# Patient Record
Sex: FEMALE | Race: BLACK OR AFRICAN AMERICAN | Employment: FULL TIME | URBAN - METROPOLITAN AREA
[De-identification: names, ages, dates, MRNs, and addresses within clinical notes are randomized per-mention and may not be internally consistent; named-entity substitution may affect disease eponyms.]

---

## 2017-03-07 ENCOUNTER — PATIENT OUTREACH (OUTPATIENT)
Dept: INTERNAL MEDICINE CLINIC | Age: 37
End: 2017-03-07

## 2018-05-17 ENCOUNTER — OFFICE VISIT (OUTPATIENT)
Dept: INTERNAL MEDICINE CLINIC | Age: 38
End: 2018-05-17

## 2018-05-17 VITALS — HEIGHT: 65 IN | BODY MASS INDEX: 27.49 KG/M2 | WEIGHT: 165 LBS | RESPIRATION RATE: 16 BRPM

## 2018-05-17 DIAGNOSIS — J45.20 MILD INTERMITTENT ASTHMA WITHOUT COMPLICATION: ICD-10-CM

## 2018-05-17 DIAGNOSIS — Z79.4 CONTROLLED TYPE 2 DIABETES MELLITUS WITHOUT COMPLICATION, WITH LONG-TERM CURRENT USE OF INSULIN (HCC): Primary | ICD-10-CM

## 2018-05-17 DIAGNOSIS — E11.9 CONTROLLED TYPE 2 DIABETES MELLITUS WITHOUT COMPLICATION, WITH LONG-TERM CURRENT USE OF INSULIN (HCC): Primary | ICD-10-CM

## 2018-05-17 DIAGNOSIS — L50.9 URTICARIA: ICD-10-CM

## 2018-05-17 LAB
CHOLEST SERPL-MCNC: 229 MG/DL
GLUCOSE POC: 388 MG/DL
HBA1C MFR BLD HPLC: 12.2 %
HDLC SERPL-MCNC: 91 MG/DL
LDL CHOLESTEROL POC: 120 MG/DL
NON-HDL CHOLESTEROL, 011976: 138
TCHOL/HDL RATIO (POC): 2.5
TRIGL SERPL-MCNC: 93 MG/DL

## 2018-05-17 RX ORDER — PREDNISONE 10 MG/1
TABLET ORAL
Qty: 21 TAB | Refills: 12 | Status: SHIPPED | OUTPATIENT
Start: 2018-05-17 | End: 2018-05-21 | Stop reason: ALTCHOICE

## 2018-05-17 RX ORDER — EPINEPHRINE 0.3 MG/.3ML
0.3 INJECTION SUBCUTANEOUS
Qty: 2 SYRINGE | Refills: 3 | Status: SHIPPED | OUTPATIENT
Start: 2018-05-17 | End: 2021-06-09

## 2018-05-17 RX ORDER — PEN NEEDLE, DIABETIC 29 G X1/2"
NEEDLE, DISPOSABLE MISCELLANEOUS
Qty: 100 PEN NEEDLE | Refills: 12 | Status: SHIPPED | OUTPATIENT
Start: 2018-05-17

## 2018-05-17 RX ORDER — MONTELUKAST SODIUM 10 MG/1
10 TABLET ORAL DAILY
Qty: 30 TAB | Refills: 12 | Status: SHIPPED | OUTPATIENT
Start: 2018-05-17 | End: 2021-06-09

## 2018-05-17 NOTE — PROGRESS NOTES
Nola Dubon is a 30 y.o. female and presents with ED Follow-up and Allergic Reaction  . Subjective:    She had a recent allergy with hives reported  States she used dye in her hair prior to most recent episode. Asthma Review:  The patient is being seen for follow up of asthma,  currently stable. Asthma symptoms occur: infrequently. Wheezing when present is described as mild and easily relieved with rescue bronchodilator. The patient reports use of a steroid inhaler. Frequency of use of quick-relief meds: rarely. Regimen compliance: The patient reports adherence to this regimen. Diabetes Mellitus Review:  She has diabetes mellitus. Diabetic ROS - medication compliance: compliant all of the time, diabetic diet compliance: compliant all of the time, home glucose monitoring: is performed. Known diabetic complications: none  Cardiovascular risk factors: family history, dyslipidemia, diabetes mellitus, obesity, hypertension  Current diabetic medications include insulin   Eye exam current (within one year): no  Weight trend: stable  Prior visit with dietician: no  Current diet: \"healthy\" diet  in general  Current exercise: walking  Current monitoring regimen: home blood tests - daily  Home blood sugar records: trend: stable  Any episodes of hypoglycemia? no  Is She on ACE inhibitor or angiotensin II receptor blocker? Yes     Allergic Rhinitis  Patient presents for evaluation of allergic symptoms. Symptoms include nasal congestion, rhinorrhea, sneezing, eye itching, watery eyes. Precipitants haved included possible pollen. Review of Systems  Constitutional: negative for fevers, chills, anorexia and weight loss  Eyes:   negative for visual disturbance and irritation  ENT:   negative for tinnitus,sore throat,nasal congestion,ear pains. hoarseness  Respiratory:  negative for cough, hemoptysis, dyspnea,wheezing  CV:   negative for chest pain, palpitations, lower extremity edema  GI: negative for nausea, vomiting, diarrhea, abdominal pain,melena  Endo:               negative for polyuria,polydipsia,polyphagia,heat intolerance  Genitourinary: negative for frequency, dysuria and hematuria  Integument:  negative for rash and pruritus  Hematologic:  negative for easy bruising and gum/nose bleeding  Musculoskel: negative for myalgias, arthralgias, back pain, muscle weakness, joint pain  Neurological:  negative for headaches, dizziness, vertigo, memory problems and gait   Behavl/Psych: negative for feelings of anxiety, depression, mood changes    Past Medical History:   Diagnosis Date    Asthma     Diabetes ([address]     Type 1     Past Surgical History:   Procedure Laterality Date    HX  SECTION  2007    HX GYN  2010    L ectopic pregnancy, L fallopian tube removed     Social History     Social History    Marital status: SINGLE     Spouse name: N/A    Number of children: N/A    Years of education: N/A     Social History Main Topics    Smoking status: Never Smoker    Smokeless tobacco: Never Used    Alcohol use No    Drug use: No    Sexual activity: Yes     Partners: Male     Birth control/ protection: None     Other Topics Concern    None     Social History Narrative     Family History   Problem Relation Age of Onset    Diabetes Mother     Hypertension Mother     Diabetes Father     Hypertension Father      Current Outpatient Prescriptions   Medication Sig Dispense Refill    insulin regular (NOVOLIN R REGULAR U-100 INSULN) 100 unit/mL injection by SubCUTAneous route.  insulin NPH (HUMULIN N NPH U-100 INSULIN) 100 unit/mL injection by SubCUTAneous route once.  montelukast (SINGULAIR) 10 mg tablet Take 1 Tab by mouth daily.  30 Tab 12    Insulin Needles, Disposable, 29 gauge x 1/2\" ndle Use as directed 100 Pen Needle 12    predniSONE (DELTASONE) 10 mg tablet 6 tabs today and reduce by 1 tab daily 21 Tab 12    EPINEPHrine (EPIPEN 2-SUMAYA) 0.3 mg/0.3 mL injection 0.3 mL by IntraMUSCular route once as needed for up to 1 dose. 2 Syringe 3    Insulin Syringe-Needle U-100 (BD INSULIN SYRINGE SAFETYGLIDE) 0.5 mL 29 gauge x 1/2\" syrg Use with insulin 120 Syringe 3    glucose blood VI test strips (ASCENSIA CONTOUR) strip Test blood sugar 3 times daily 100 Strip 12    Blood-Glucose Meter (CONTOUR METER) monitoring kit Use as directed 1 Kit 0    Lancets Misc As directed 1 Package 11    insulin NPH (NOVOLIN N) 100 unit/mL injection 20 units am 20 units pm 3 Vial 3    insulin regular (NOVOLIN R) 100 unit/mL injection 20 units am,lunch,dinner 3 Vial 3    sucralfate (CARAFATE) 1 gram tablet Take 1 Tab by mouth four (4) times daily. 120 Tab 12    Omeprazole delayed release (PRILOSEC D/R) 20 mg tablet Take 1 Tab by mouth daily. 30 Tab 3    albuterol (PROVENTIL HFA, VENTOLIN HFA, PROAIR HFA) 90 mcg/actuation inhaler Take 2 Puffs by inhalation every four (4) hours as needed for Wheezing. 1 Inhaler 12    ipratropium (ATROVENT) 0.06 % nasal spray 2 Sprays by Both Nostrils route four (4) times daily. 15 mL 12    pantoprazole (PROTONIX) 40 mg tablet TAKE 1 TABLET BY MOUTH DAILY 30 Tab 0    traMADol (ULTRAM) 50 mg tablet Take 1 Tab by mouth every six (6) hours as needed for Pain. Max Daily Amount: 200 mg. 50 Tab 0    traMADol (ULTRAM) 50 mg tablet Take 1 Tab by mouth every six (6) hours as needed for Pain. 30 Tab 0    insulin NPH (NOVOLIN N, HUMULIN N) 100 unit/mL injection 30 units am,30 units pm 3 vial 12    insulin regular (NOVOLIN R, HUMULIN R) 100 unit/mL injection 20 units am 20 units noon 20 units pm 3 vial 12    triamcinolone acetonide (KENALOG) 0.5 % ointment Apply  to affected area two (2) times a day. use thin layer 30 g 3    insulin aspart (NOVOLOG) 100 unit/mL injection by SubCUTAneous route.  insulin (NOVOLOG 70-30 MIX) 100 unit/mL (70-30) flex pen 30 units bid 1 Package 12    ibuprofen (MOTRIN) 800 mg tablet Take 1 Tab by mouth two (2) times daily (after meals). 60 Tab 12    acetaminophen-codeine (TYLENOL-CODEINE #3) 300-30 mg per tablet Take 1 Tab by mouth every four (4) hours as needed for Pain. 60 Tab 0    insulin lispro (HUMALOG) 100 unit/mL injection Use as a sliding scale as directed 3 Vial 12    clobetasol (TEMOVATE) 0.05 % ointment Apply  to affected area two (2) times a day. 15 g 0    codeine-butalbital-aspirin-caffeine (FIORINAL WITH CODEINE) 62--40 mg per capsule Take 1 Cap by mouth every eight (8) hours as needed for Pain. 30 Cap 0     Allergies   Allergen Reactions    Doxycycline Hives       Objective:  Visit Vitals    Resp 16    Ht 5' 5\" (1.651 m)    Wt 165 lb (74.8 kg)    BMI 27.46 kg/m2     Physical Exam:   General appearance - alert, well appearing, and in no distress  Mental status - alert, oriented to person, place, and time  EYE-HARRY, EOMI, corneas normal, no foreign bodies  ENT-ENT exam normal, no neck nodes or sinus tenderness  Nose - normal and patent, no erythema, discharge or polyps  Mouth - mucous membranes moist, pharynx normal without lesions  Neck - supple, no significant adenopathy   Chest - clear to auscultation, no wheezes, rales or rhonchi, symmetric air entry   Heart - normal rate, regular rhythm, normal S1, S2, no murmurs, rubs, clicks or gallops   Abdomen - soft, nontender, nondistended, no masses or organomegaly  Lymph- no adenopathy palpable  Ext-peripheral pulses normal, no pedal edema, no clubbing or cyanosis  Skin-Warm and dry.  no hyperpigmentation, vitiligo, or suspicious lesions  Neuro -alert, oriented, normal speech, no focal findings or movement disorder noted  Neck-normal C-spine, no tenderness, full ROM without pain      Results for orders placed or performed in visit on 05/17/18   AMB POC LIPID PROFILE   Result Value Ref Range    Cholesterol (POC) 229     Triglycerides (POC) 93     HDL Cholesterol (POC) 91     Non-HDL Cholesterol 138     LDL Cholesterol (POC) 120 MG/DL    TChol/HDL Ratio (POC) 2.5    AMB POC HEMOGLOBIN A1C   Result Value Ref Range    Hemoglobin A1c (POC) 12.2 %   AMB POC GLUCOSE BLOOD, BY GLUCOSE MONITORING DEVICE   Result Value Ref Range    Glucose  mg/dL       Assessment/Plan:    ICD-10-CM ICD-9-CM    1. Controlled type 2 diabetes mellitus without complication, with long-term current use of insulin ([facility]) E11.9 250.00 AMB POC LIPID PROFILE    Z79.4 V58.67 AMB POC HEMOGLOBIN A1C      AMB POC GLUCOSE BLOOD, BY GLUCOSE MONITORING DEVICE   2. Urticaria L50.9 708.9 predniSONE (DELTASONE) 10 mg tablet   3. Uncontrolled type 1 diabetes mellitus without complication ([facility]) H11.04 250.03 Insulin Needles, Disposable, 29 gauge x 1/2\" ndle   4. Mild intermittent asthma without complication W93.54 827.36      Orders Placed This Encounter    AMB POC LIPID PROFILE    AMB POC HEMOGLOBIN A1C    AMB POC GLUCOSE BLOOD, BY GLUCOSE MONITORING DEVICE    insulin regular (NOVOLIN R REGULAR U-100 INSULN) 100 unit/mL injection     Sig: by SubCUTAneous route.  insulin NPH (HUMULIN N NPH U-100 INSULIN) 100 unit/mL injection     Sig: by SubCUTAneous route once.  montelukast (SINGULAIR) 10 mg tablet     Sig: Take 1 Tab by mouth daily. Dispense:  30 Tab     Refill:  12    Insulin Needles, Disposable, 29 gauge x 1/2\" ndle     Sig: Use as directed     Dispense:  100 Pen Needle     Refill:  12    predniSONE (DELTASONE) 10 mg tablet     Si tabs today and reduce by 1 tab daily     Dispense:  21 Tab     Refill:  12    EPINEPHrine (EPIPEN 2-SUMAYA) 0.3 mg/0.3 mL injection     Si.3 mL by IntraMUSCular route once as needed for up to 1 dose. Dispense:  2 Syringe     Refill:  3     routine labs ordered,Take 81mg aspirin daily  Patient Instructions   [employer] Activation    Thank you for requesting access to [employer]. Please follow the instructions below to securely access and download your online medical record.  [employer] allows you to send messages to your doctor, view your test results, renew your prescriptions, schedule appointments, and more. How Do I Sign Up? 1. In your internet browser, go to [email or web address]  2. Click on the First Time User? Click Here link in the Sign In box. You will be redirect to the New Member Sign Up page. 3. Enter your [employer] Access Code exactly as it appears below. You will not need to use this code after youve completed the sign-up process. If you do not sign up before the expiration date, you must request a new code. [employer] Access Code: [email or web address]  Expires: 8/15/2018  2:22 PM (This is the date your [employer] access code will )    4. Enter the last four digits of your Social Security Number (xxxx) and Date of Birth (mm/dd/yyyy) as indicated and click Submit. You will be taken to the next sign-up page. 5. Create a [employer] ID. This will be your [employer] login ID and cannot be changed, so think of one that is secure and easy to remember. 6. Create a [employer] password. You can change your password at any time. 7. Enter your Password Reset Question and Answer. This can be used at a later time if you forget your password. 8. Enter your e-mail address. You will receive e-mail notification when new information is available in [address] 9. Click Sign Up. You can now view and download portions of your medical record. 10. Click the Download Summary menu link to download a portable copy of your medical information. Additional Information    If you have questions, please visit the Frequently Asked Questions section of the [employer] website at [email or web address] Remember, [employer] is NOT to be used for urgent needs. For medical emergencies, dial 911. Follow-up Disposition:  Return in about 1 week (around 2018). I have reviewed with the patient details of the assessment and plan and all questions were answered. Relevent patient education was performed    An After Visit Summary was printed and given to the patient.

## 2018-05-17 NOTE — PATIENT INSTRUCTIONS
[employer] Activation    Thank you for requesting access to [employer]. Please follow the instructions below to securely access and download your online medical record. [employer] allows you to send messages to your doctor, view your test results, renew your prescriptions, schedule appointments, and more. How Do I Sign Up? 1. In your internet browser, go to [email or web address]  2. Click on the First Time User? Click Here link in the Sign In box. You will be redirect to the New Member Sign Up page. 3. Enter your [employer] Access Code exactly as it appears below. You will not need to use this code after youve completed the sign-up process. If you do not sign up before the expiration date, you must request a new code. [employer] Access Code: [email or web address]  Expires: 8/15/2018  2:22 PM (This is the date your [employer] access code will )    4. Enter the last four digits of your Social Security Number (xxxx) and Date of Birth (mm/dd/yyyy) as indicated and click Submit. You will be taken to the next sign-up page. 5. Create a [employer] ID. This will be your [employer] login ID and cannot be changed, so think of one that is secure and easy to remember. 6. Create a [employer] password. You can change your password at any time. 7. Enter your Password Reset Question and Answer. This can be used at a later time if you forget your password. 8. Enter your e-mail address. You will receive e-mail notification when new information is available in [address] 9. Click Sign Up. You can now view and download portions of your medical record. 10. Click the Download Summary menu link to download a portable copy of your medical information. Additional Information    If you have questions, please visit the Frequently Asked Questions section of the [employer] website at [email or web address] Remember, [employer] is NOT to be used for urgent needs. For medical emergencies, dial 911.

## 2018-05-17 NOTE — MR AVS SNAPSHOT
Brent Callejas 
 
 
 [address] [place] [address],[address] [place] [id number] 
[phone] Patient: Kang Jefferson MRN:  [id number] Visit Information Date & Time Provider Department Dept. Phone Encounter #  
 5/17/2018  1:30 PM Keturah Matson MD [address] [place] [phone] [id number] Follow-up Instructions Return in about 1 week (around 5/24/2018). Upcoming Health Maintenance Date Due Pneumococcal 19-64 Medium Risk (1 of 1 - PPSV23) 7/31/2001 EYE EXAM RETINAL OR DILATED Q1 10/16/2015 LIPID PANEL Q1 10/17/2015 FOOT EXAM Q1 4/29/2016 MICROALBUMIN Q1 9/28/2016 PAP AKA CERVICAL CYTOLOGY 11/30/2016 HEMOGLOBIN A1C Q6M 6/2/2017 DTaP/Tdap/Td series (1 - Tdap) 8/22/2018* Influenza Age 5 to Adult 8/1/2018 *Topic was postponed. The date shown is not the original due date. Allergies as of 5/17/2018  Review Complete On: 12/2/2016 By: Keturah Matson MD  
  
 Severity Noted Reaction Type Reactions Doxycycline  03/27/2011    Hives Current Immunizations  Never Reviewed No immunizations on file. Not reviewed this visit You Were Diagnosed With   
  
 Codes Comments Controlled type 2 diabetes mellitus without complication, with long-term current use of insulin (HCC)    -  Primary ICD-10-CM: E11.9, Z79.4 ICD-9-CM: 250.00, V58.67 Urticaria     ICD-10-CM: L50.9 ICD-9-CM: 708.9 Uncontrolled type 1 diabetes mellitus without complication (HCC)     VIP-61-OZ: E10.65 ICD-9-CM: 250.03 Mild intermittent asthma without complication     EWX-37-VM: J45.20 ICD-9-CM: 493.90 Vitals Resp Height(growth percentile) Weight(growth percentile) BMI OB Status Smoking Status 16 5' 5\" (1.651 m) 165 lb (74.8 kg) 27.46 kg/m2 Having regular periods Never Smoker BMI and BSA Data Body Mass Index Body Surface Area  
 27.46 kg/m 2 1.85 m 2 Preferred Pharmacy Pharmacy Name Phone [facility]ARMACY #[id number], [address] [phone] Your Updated Medication List  
  
   
This list is accurate as of 5/17/18  2:23 PM.  Always use your most recent med list.  
  
  
  
  
 acetaminophen-codeine 300-30 mg per tablet Commonly known as:  TYLENOL-CODEINE #3 Take 1 Tab by mouth every four (4) hours as needed for Pain. albuterol 90 mcg/actuation inhaler Commonly known as:  PROVENTIL HFA, VENTOLIN HFA, PROAIR HFA Take 2 Puffs by inhalation every four (4) hours as needed for Wheezing. Blood-Glucose Meter monitoring kit Commonly known as:  CONTOUR METER Use as directed  
  
 clobetasol 0.05 % ointment Commonly known as:  Ann Cocking Apply  to affected area two (2) times a day. codeine-butalbital-aspirin-caffeine 86--40 mg per capsule Commonly known as:  [address] Take 1 Cap by mouth every eight (8) hours as needed for Pain. EPINEPHrine 0.3 mg/0.3 mL injection Commonly known as:  EPIPEN 2-SUMAYA  
0.3 mL by IntraMUSCular route once as needed for up to 1 dose. glucose blood VI test strips strip Commonly known as:  Ascensia CONTOUR Test blood sugar 3 times daily  
  
 ibuprofen 800 mg tablet Commonly known as:  MOTRIN Take 1 Tab by mouth two (2) times daily (after meals). insulin aspart protamine/insulin aspart 100 unit/mL (70-30) Inpn Commonly known as:  NOVOLOG MIX 70/30  
30 units bid  
  
 insulin lispro 100 unit/mL injection Commonly known as:  HumaLOG U-100 Insulin Use as a sliding scale as directed Insulin Needles (Disposable) 29 gauge x 1/2\" Ndle Use as directed * HumuLIN N NPH U-100 Insulin 100 unit/mL injection Generic drug:  insulin NPH  
by SubCUTAneous route once. * insulin  unit/mL injection Commonly known as:  NOVOLIN N, HUMULIN N  
30 units am,30 units pm  
  
 * insulin  unit/mL injection Commonly known as:  NovoLIN N NPH U-100 Insulin 20 units am 20 units pm  
  
 * NovoLIN R Regular U-100 Insuln 100 unit/mL injection Generic drug:  insulin regular  
by SubCUTAneous route. * insulin regular 100 unit/mL injection Commonly known as:  NOVOLIN R, HUMULIN R  
20 units am 20 units noon 20 units pm  
  
 * insulin regular 100 unit/mL injection Commonly known as:  NovoLIN R Regular U-100 Insuln 20 units am,lunch,dinner Insulin Syringe-Needle U-100 0.5 mL 29 gauge x 1/2\" Syrg Commonly known as:  BD Insulin Syringe SafetyGlide Use with insulin  
  
 ipratropium 42 mcg (0.06 %) nasal spray Commonly known as:  ATROVENT  
2 Sprays by Both Nostrils route four (4) times daily. Lancets Misc As directed  
  
 montelukast 10 mg tablet Commonly known as:  SINGULAIR Take 1 Tab by mouth daily. NovoLOG U-100 Insulin aspart 100 unit/mL injection Generic drug:  insulin aspart U-100  
by SubCUTAneous route. Omeprazole delayed release 20 mg tablet Commonly known as:  PRILOSEC D/R Take 1 Tab by mouth daily. pantoprazole 40 mg tablet Commonly known as:  PROTONIX  
TAKE 1 TABLET BY MOUTH DAILY predniSONE 10 mg tablet Commonly known as:  DELTASONE  
6 tabs today and reduce by 1 tab daily  
  
 sucralfate 1 gram tablet Commonly known as:  Rodger Dec Take 1 Tab by mouth four (4) times daily. * traMADol 50 mg tablet Commonly known as:  ULTRAM  
Take 1 Tab by mouth every six (6) hours as needed for Pain. * traMADol 50 mg tablet Commonly known as:  ULTRAM  
Take 1 Tab by mouth every six (6) hours as needed for Pain. Max Daily Amount: 200 mg.  
  
 triamcinolone acetonide 0.5 % ointment Commonly known as:  KENALOG Apply  to affected area two (2) times a day. use thin layer * Notice:   This list has 8 medication(s) that are the same as other medications prescribed for you. Read the directions carefully, and ask your doctor or other care provider to review them with you. Prescriptions Sent to Pharmacy Refills  
 montelukast (SINGULAIR) 10 mg tablet 12 Sig: Take 1 Tab by mouth daily. Class: Normal  
 Pharmacy: [address] [place] [address], [address]  #: [phone] Route: Oral  
 Insulin Needles, Disposable, 29 gauge x 1/2\" ndle 12 Sig: Use as directed Class: Normal  
 Pharmacy: [facility]/pharmacy #[id number], [address] Ph #: [phone]  
 predniSONE (DELTASONE) 10 mg tablet 12 Si tabs today and reduce by 1 tab daily Class: Normal  
 Pharmacy: [address] [place] [address], [address] Ph #: [phone] EPINEPHrine (EPIPEN 2-SUMAYA) 0.3 mg/0.3 mL injection 3 Si.3 mL by IntraMUSCular route once as needed for up to 1 dose. Class: Normal  
 Pharmacy: [address] [place] [address], [address] Ph #: [phone] Route: IntraMUSCular We Performed the Following AMB POC GLUCOSE BLOOD, BY GLUCOSE MONITORING DEVICE [62073 CPT(R)] AMB POC HEMOGLOBIN A1C [43322 CPT(R)] AMB POC LIPID PROFILE [38909 CPT(R)] Follow-up Instructions Return in about 1 week (around 2018). Patient Instructions [employer] Activation Thank you for requesting access to [employer]. Please follow the instructions below to securely access and download your online medical record. [employer] allows you to send messages to your doctor, view your test results, renew your prescriptions, schedule appointments, and more. How Do I Sign Up? 1. In your internet browser, go to [email or web address] 
2. Click on the First Time User? Click Here link in the Sign In box.  You will be redirect to the New Member Sign Up page. 3. Enter your [employer] Access Code exactly as it appears below. You will not need to use this code after youve completed the sign-up process. If you do not sign up before the expiration date, you must request a new code. [employer] Access Code: [email or web address] Expires: 8/15/2018  2:22 PM (This is the date your [employer] access code will ) 4. Enter the last four digits of your Social Security Number (xxxx) and Date of Birth (mm/dd/yyyy) as indicated and click Submit. You will be taken to the next sign-up page. 5. Create a VocalZoomt ID. This will be your [employer] login ID and cannot be changed, so think of one that is secure and easy to remember. 6. Create a [employer] password. You can change your password at any time. 7. Enter your Password Reset Question and Answer. This can be used at a later time if you forget your password. 8. Enter your e-mail address. You will receive e-mail notification when new information is available in [address] 9. Click Sign Up. You can now view and download portions of your medical record. 10. Click the Download Summary menu link to download a portable copy of your medical information. Additional Information If you have questions, please visit the Frequently Asked Questions section of the [employer] website at [email or web address] Remember, [employer] is NOT to be used for urgent needs. For medical emergencies, dial 911. Introducing [facility]! Chuyita Mancilla introduces [employer] patient portal. Now you can access parts of your medical record, email your doctor's office, and request medication refills online. 1. In your internet browser, go to [email or web address] 2. Click on the First Time User? Click Here link in the Sign In box. You will see the New Member Sign Up page. 3. Enter your [employer] Access Code exactly as it appears below.  You will not need to use this code after youve completed the sign-up process. If you do not sign up before the expiration date, you must request a new code. · [employer] Access Code: [email or web address] Expires: 8/15/2018  2:22 PM 
 
4. Enter the last four digits of your Social Security Number (xxxx) and Date of Birth (mm/dd/yyyy) as indicated and click Submit. You will be taken to the next sign-up page. 5. Create a [employer] ID. This will be your [employer] login ID and cannot be changed, so think of one that is secure and easy to remember. 6. Create a [employer] password. You can change your password at any time. 7. Enter your Password Reset Question and Answer. This can be used at a later time if you forget your password. 8. Enter your e-mail address. You will receive e-mail notification when new information is available in [address] 9. Click Sign Up. You can now view and download portions of your medical record. 10. Click the Download Summary menu link to download a portable copy of your medical information. If you have questions, please visit the Frequently Asked Questions section of the [employer] website. Remember, [employer] is NOT to be used for urgent needs. For medical emergencies, dial 911. Now available from your iPhone and Android! Please provide this summary of care documentation to your next provider. Your primary care clinician is listed as Shivam Points. If you have any questions after today's visit, please call [phone].

## 2018-05-21 ENCOUNTER — OFFICE VISIT (OUTPATIENT)
Dept: INTERNAL MEDICINE CLINIC | Age: 38
End: 2018-05-21

## 2018-05-21 VITALS
HEIGHT: 65 IN | HEART RATE: 104 BPM | WEIGHT: 160.5 LBS | OXYGEN SATURATION: 97 % | BODY MASS INDEX: 26.74 KG/M2 | TEMPERATURE: 97.7 F | RESPIRATION RATE: 16 BRPM | DIASTOLIC BLOOD PRESSURE: 94 MMHG | SYSTOLIC BLOOD PRESSURE: 145 MMHG

## 2018-05-21 DIAGNOSIS — R51.9 INTRACTABLE EPISODIC HEADACHE, UNSPECIFIED HEADACHE TYPE: ICD-10-CM

## 2018-05-21 DIAGNOSIS — B34.9 VIRAL ILLNESS: ICD-10-CM

## 2018-05-21 DIAGNOSIS — R51.9 GENERALIZED HEADACHES: ICD-10-CM

## 2018-05-21 DIAGNOSIS — R03.0 ELEVATED BP WITHOUT DIAGNOSIS OF HYPERTENSION: ICD-10-CM

## 2018-05-21 DIAGNOSIS — E10.65 HYPERGLYCEMIA DUE TO TYPE 1 DIABETES MELLITUS (HCC): Primary | ICD-10-CM

## 2018-05-21 RX ORDER — FAMOTIDINE 20 MG/1
TABLET, FILM COATED ORAL
Refills: 0 | COMMUNITY
Start: 2018-05-13 | End: 2018-05-21 | Stop reason: ALTCHOICE

## 2018-05-21 RX ORDER — BUTALBITAL, ASPIRIN, CAFFEINE AND CODEINE PHOSPHATE 50; 325; 40; 30 MG/1; MG/1; MG/1; MG/1
1 CAPSULE ORAL
Qty: 30 CAP | Refills: 0 | Status: SHIPPED | OUTPATIENT
Start: 2018-05-21 | End: 2018-06-21 | Stop reason: ALTCHOICE

## 2018-05-21 NOTE — PROGRESS NOTES
Nausea (since last thursday) and Headache (for approx 3 days)       Subjective:   HPI     Patient presents with c/o of nausea and persistent headache for a few days. Vomiting times one. Ate salmon and rice this morning and was able to keep it down. She cannot relate this to any foods. She denies fever. She was evaluated in the Er at [facility]. No records available. She seems to be taking multiple medication including hydrocodone-acetamenophen which she states made her throw-up. Hx of headache treated previously Fiorinal.  Requesting sick note for work. Diabetes Mellitus Review:  She has diabetes mellitus. Patient appears confused about her insulin regimen and takes her insulin in several different ways and dosages. Her BS today is 293. She was given Prednisone in the ER. Diabetic ROS - Questionable medication compliance. She is requesting more diabetic tests strips, and has not checked BS in a few days. Hx of ulcers and has been taking Ibuprofen. Asthma Review:  The patient is being seen for follow up of asthma,  currently stable. Asthma symptoms occur: infrequently. Wheezing when present is described as mild and easily relieved with rescue bronchodilator. The patient reports use of a steroid inhaler. Frequency of use of quick-relief meds: rarely. Regimen compliance: The patient reports adherence to this regimen. Elevated BP:  145/94. Re-check: 130/90. Past Medical History:   Diagnosis Date    Asthma     Diabetes ([facility] [address]     Type 1    Headache        Past Surgical History:   Procedure Laterality Date    HX  SECTION      HX GYN      L ectopic pregnancy, L fallopian tube removed       Prior to Admission medications    Medication Sig Start Date End Date Taking?  Authorizing Provider   codeine-butalbital-aspirin-caffeine [place] WITH CODEINE) 28--40 mg per capsule Take 1 Cap by mouth every eight (8) hours as needed for Pain. 18  Yes Ramandeep Faye, GABBIE   glucose blood VI test strips (TRUETRACK TEST) strip Check blood glucose 2-3 times a day 5/21/18  Yes Chiquis Lu NP   insulin regular (NOVOLIN R REGULAR U-100 INSULN) 100 unit/mL injection by SubCUTAneous route. Yes Historical Provider   insulin NPH (HUMULIN N NPH U-100 INSULIN) 100 unit/mL injection by SubCUTAneous route once. Yes Historical Provider   montelukast (SINGULAIR) 10 mg tablet Take 1 Tab by mouth daily. 5/17/18  Yes Jarred Gaitan MD   Insulin Needles, Disposable, 29 gauge x 1/2\" ndle Use as directed 5/17/18  Yes Jarred Gaitan MD   EPINEPHrine (EPIPEN 2-SUMAYA) 0.3 mg/0.3 mL injection 0.3 mL by IntraMUSCular route once as needed for up to 1 dose. 5/17/18  Yes Jarred Gaitan MD   Insulin Syringe-Needle U-100 (BD INSULIN SYRINGE SAFETYGLIDE) 0.5 mL 29 gauge x 1/2\" syrg Use with insulin 12/2/16  Yes Jarred Gaitan MD   insulin NPH (NOVOLIN N) 100 unit/mL injection 20 units am 20 units pm 12/2/16  Yes Jarred Gaitan MD   sucralfate (CARAFATE) 1 gram tablet Take 1 Tab by mouth four (4) times daily. 12/2/16  Yes Jarred Gaitan MD   albuterol (PROVENTIL HFA, VENTOLIN HFA, PROAIR HFA) 90 mcg/actuation inhaler Take 2 Puffs by inhalation every four (4) hours as needed for Wheezing. 12/2/16  Yes Jarred Gaitan MD   ipratropium (ATROVENT) 0.06 % nasal spray 2 Sprays by Both Nostrils route four (4) times daily.  12/2/16  Yes Jarred Gaitan MD   insulin NPH (NOVOLIN N, HUMULIN N) 100 unit/mL injection 30 units am,30 units pm 1/22/15  Yes Jarred Gaitan MD   insulin regular (NOVOLIN R, HUMULIN R) 100 unit/mL injection 20 units am 20 units noon 20 units pm 1/22/15  Yes Jarred Gaitan MD   insulin (NOVOLOG 70-30 MIX) 100 unit/mL (70-30) flex pen 30 units bid 4/23/14  Yes Jarred Gaitan MD   glucose blood VI test strips (ASCENSIA CONTOUR) strip Test blood sugar 3 times daily 11/6/13  Yes Jarred Gaitan MD   clobetasol (TEMOVATE) 0.05 % ointment Apply  to affected area two (2) times a day. 7/23/13  Yes Gali Rosa NP   Blood-Glucose Meter (CONTOUR METER) monitoring kit Use as directed 6/25/12  Yes Art Gibbs MD   Lancets Misc As directed 3/8/12  Yes Art Gibbs MD   insulin regular (NOVOLIN R) 100 unit/mL injection 20 units am,lunch,dinner 12/2/16   Art Gibbs MD   triamcinolone acetonide (KENALOG) 0.5 % ointment Apply  to affected area two (2) times a day. use thin layer 1/22/15   Art Gibbs MD   insulin aspart (NOVOLOG) 100 unit/mL injection by SubCUTAneous route. Historical Provider        Allergies   Allergen Reactions    Doxycycline Hives        Social History     Social History    Marital status: SINGLE     Spouse name: N/A    Number of children: N/A    Years of education: N/A     Occupational History    Not on file. Social History Main Topics    Smoking status: Never Smoker    Smokeless tobacco: Never Used    Alcohol use No    Drug use: No    Sexual activity: Yes     Partners: Male     Birth control/ protection: None     Other Topics Concern    Not on file     Social History Narrative        Family History   Problem Relation Age of Onset    Diabetes Mother     Hypertension Mother     Diabetes Father     Hypertension Father           Review of Systems   Constitutional: Positive for malaise/fatigue. Negative for chills, diaphoresis and fever. HENT: Negative for congestion, ear discharge, ear pain, nosebleeds and sinus pain. Eyes: Negative for blurred vision, pain, discharge and redness. Respiratory: Negative for cough, shortness of breath and wheezing. Cardiovascular: Negative for chest pain, palpitations and leg swelling. Gastrointestinal: Positive for nausea and vomiting. Negative for abdominal pain, constipation, diarrhea and heartburn. Genitourinary: Negative for dysuria. Musculoskeletal: Negative for myalgias. Skin: Negative for rash. Neurological: Positive for headaches.  Negative for dizziness, tingling, tremors, sensory change and weakness. Psychiatric/Behavioral: Negative for depression. Objective:     Vitals:    05/21/18 1103   BP: (!) 145/94   Pulse: (!) 104   Resp: 16   Temp: 97.7 °F (36.5 °C)   TempSrc: Oral   SpO2: 97%   Weight: 160 lb 8 oz (72.8 kg)   Height: 5' 5\" (1.651 m)        Physical Exam   Constitutional: She is oriented to person, place, and time. She appears well-developed and well-nourished. No distress. HENT:   Head: Normocephalic and atraumatic. Eyes: Pupils are equal, round, and reactive to light. Neck: Normal range of motion. Neck supple. No thyromegaly present. Cardiovascular: Normal rate, regular rhythm and normal heart sounds. Elevated BP   Pulmonary/Chest: Effort normal and breath sounds normal. No respiratory distress. She has no wheezes. She has no rales. She exhibits no tenderness. Abdominal: Soft. Bowel sounds are normal. She exhibits no distension. There is no tenderness. Musculoskeletal: Normal range of motion. Lymphadenopathy:     She has no cervical adenopathy. Neurological: She is alert and oriented to person, place, and time. Skin: Skin is warm and dry. She is not diaphoretic. Psychiatric: She has a normal mood and affect. Nursing note and vitals reviewed. Assessment/ Plan:       ICD-10-CM ICD-9-CM    1. Hyperglycemia due to type 1 diabetes mellitus (HCC) E10.65 250.01 glucose blood VI test strips (TRUETRACK TEST) strip   2. Intractable episodic headache, unspecified headache type R51 784.0 codeine-butalbital-aspirin-caffeine (FIORINAL WITH CODEINE) 27--40 mg per capsule   3. Generalized headaches R51 784.0 codeine-butalbital-aspirin-caffeine (FIORINAL WITH CODEINE) 40--40 mg per capsule   4. Viral illness B34.9 079.99    5.  Elevated BP without diagnosis of hypertension R03.0 796.2         Orders Placed This Encounter    DISCONTD: famotidine (PEPCID) 20 mg tablet     Sig: TAKE 1 TABLET BY MOUTH TWICE A DAY Refill:  0    codeine-butalbital-aspirin-caffeine (FIORINAL WITH CODEINE) 08--40 mg per capsule     Sig: Take 1 Cap by mouth every eight (8) hours as needed for Pain. Dispense:  30 Cap     Refill:  0    glucose blood VI test strips (TRUETRACK TEST) strip     Sig: Check blood glucose 2-3 times a day     Dispense:  100 Strip     Refill:  3        I have reviewed the patient's medical history in detail and updated the computerized patient record. Instructed patient to check BS more frequently during illness and refilled test strips. Advised if BS goes above 300, continued headache and elevated BP to go to ER. Advised the BRAT diet and slowly reintroducing regular or diabetic diet when able to tolerate this. Avoid greasy, spicy, and fried foods. With hx of ulcers advised against taking Ibuprofen. Rx for Fiorinol for headache and d/c'ed Tylenol with codeine and hydrocodone-acetominophen she received in the ER. Work note given. No change in insulin as patient is not taking 3 meals a day and taking a normal diet. Instructed to check BP prn. We had a prolonged discussion about these complex clinical issues and went over the various important aspects to consider. All questions were answered. Advised her to call back, return to office, or go to ER if symptoms do not improve, change in nature, or persist. Keep appt on 5/24/18 if symptoms persist or go to ER. She was given an after visit summary or informed of [employer] Access which includes patient instructions, diagnoses, current medications, & vitals. She expressed understanding with the diagnosis and plan and was given the opportunity to ask questions.     Reji Li DNP

## 2018-05-21 NOTE — MR AVS SNAPSHOT
[address] [address] 
[phone] Patient: Susanne Orozco MRN:  DID:[id number] Visit Information Date & Time Provider Department Dept. Phone Encounter #  
 5/21/2018 11:00 AM Emanuel Milan NP [address] [facility] [phone] [id number] Follow-up Instructions Return if symptoms worsen or fail to improve, for keep appt 5/24/18 if no better. Your Appointments 5/24/2018  9:15 AM  
ROUTINE CARE with Gabbie Pike MD  
[facility] - [address] [place] [address] ([facility]) Appt Note: 1 week f/u  fmla paperwork [address] [place] [address]  
[phone]  
  
   
 [address] [place] [id number] Upcoming Health Maintenance Date Due Pneumococcal 19-64 Medium Risk (1 of 1 - PPSV23) 7/31/2001 EYE EXAM RETINAL OR DILATED Q1 10/16/2015 FOOT EXAM Q1 4/29/2016 MICROALBUMIN Q1 9/28/2016 PAP AKA CERVICAL CYTOLOGY 11/30/2016 DTaP/Tdap/Td series (1 - Tdap) 8/22/2018* Influenza Age 5 to Adult 8/1/2018 HEMOGLOBIN A1C Q6M 11/17/2018 LIPID PANEL Q1 5/17/2019 *Topic was postponed. The date shown is not the original due date. Allergies as of 5/21/2018  Review Complete On: 5/21/2018 By: Emanuel Milan NP Severity Noted Reaction Type Reactions Doxycycline  03/27/2011    Hives Current Immunizations  Never Reviewed No immunizations on file. Not reviewed this visit You Were Diagnosed With   
  
 Codes Comments Hyperglycemia due to type 1 diabetes mellitus ([facility]    -  Primary ICD-10-CM: E10.65 ICD-9-CM: 250.01 Intractable episodic headache, unspecified headache type     ICD-10-CM: R51 ICD-9-CM: 784.0 Generalized headaches     ICD-10-CM: R51 ICD-9-CM: 068. 0 Viral illness     ICD-10-CM: B34.9 ICD-9-CM: 079.99 Vitals BP Pulse Temp Resp Height(growth percentile) Weight(growth percentile) (!) 145/94 (BP 1 Location: Left arm, BP Patient Position: Sitting) (!) 104 97.7 °F (36.5 °C) (Oral) 16 5' 5\" (1.651 m) 160 lb 8 oz (72.8 kg) SpO2 BMI OB Status Smoking Status 97% 26.71 kg/m2 Having regular periods Never Smoker Vitals History BMI and BSA Data Body Mass Index Body Surface Area  
 26.71 kg/m 2 1.83 m 2 Preferred Pharmacy Pharmacy Name Phone [id number] [place], [address] [phone] Your Updated Medication List  
  
   
This list is accurate as of 5/21/18  1:10 PM.  Always use your most recent med list.  
  
  
  
  
 albuterol 90 mcg/actuation inhaler Commonly known as:  PROVENTIL HFA, VENTOLIN HFA, PROAIR HFA Take 2 Puffs by inhalation every four (4) hours as needed for Wheezing. Blood-Glucose Meter monitoring kit Commonly known as:  CONTOUR METER Use as directed  
  
 clobetasol 0.05 % ointment Commonly known as:  Gladystine Dryer Apply  to affected area two (2) times a day. codeine-butalbital-aspirin-caffeine 37--40 mg per capsule Commonly known as:  [address] Take 1 Cap by mouth every eight (8) hours as needed for Pain. EPINEPHrine 0.3 mg/0.3 mL injection Commonly known as:  EPIPEN 2-SUMAYA  
0.3 mL by IntraMUSCular route once as needed for up to 1 dose. * glucose blood VI test strips strip Commonly known as:  Ascensia CONTOUR Test blood sugar 3 times daily * glucose blood VI test strips strip Commonly known as:  TRUETRACK TEST Check blood glucose 2-3 times a day  
  
 insulin aspart protamine/insulin aspart 100 unit/mL (70-30) Inpn Commonly known as:  NOVOLOG MIX 70/30  
30 units bid Insulin Needles (Disposable) 29 gauge x 1/2\" Ndle Use as directed * HumuLIN N NPH U-100 Insulin 100 unit/mL injection Generic drug:  insulin NPH  
by SubCUTAneous route once. * insulin  unit/mL injection Commonly known as:  NOVOLIN N, HUMULIN N  
30 units am,30 units pm  
  
 * insulin  unit/mL injection Commonly known as:  NovoLIN N NPH U-100 Insulin 20 units am 20 units pm  
  
 * NovoLIN R Regular U-100 Insuln 100 unit/mL injection Generic drug:  insulin regular  
by SubCUTAneous route. * insulin regular 100 unit/mL injection Commonly known as:  NOVOLIN R, HUMULIN R  
20 units am 20 units noon 20 units pm  
  
 * insulin regular 100 unit/mL injection Commonly known as:  NovoLIN R Regular U-100 Insuln 20 units am,lunch,dinner Insulin Syringe-Needle U-100 0.5 mL 29 gauge x 1/2\" Syrg Commonly known as:  BD Insulin Syringe SafetyGlide Use with insulin  
  
 ipratropium 42 mcg (0.06 %) nasal spray Commonly known as:  ATROVENT  
2 Sprays by Both Nostrils route four (4) times daily. Lancets Misc As directed  
  
 montelukast 10 mg tablet Commonly known as:  SINGULAIR Take 1 Tab by mouth daily. NovoLOG U-100 Insulin aspart 100 unit/mL injection Generic drug:  insulin aspart U-100  
by SubCUTAneous route. sucralfate 1 gram tablet Commonly known as:  Allie Gum Take 1 Tab by mouth four (4) times daily. triamcinolone acetonide 0.5 % ointment Commonly known as:  KENALOG Apply  to affected area two (2) times a day. use thin layer * Notice: This list has 8 medication(s) that are the same as other medications prescribed for you. Read the directions carefully, and ask your doctor or other care provider to review them with you. Prescriptions Printed Refills  
 codeine-butalbital-aspirin-caffeine (FIORINAL WITH CODEINE) 87--40 mg per capsule 0 Sig: Take 1 Cap by mouth every eight (8) hours as needed for Pain. Class: Print Route: Oral  
  
Prescriptions Sent to Pharmacy Refills glucose blood VI test strips (TRUETRACK TEST) strip 3 Sig: Check blood glucose 2-3 times a day Class: Normal  
 Pharmacy: [address] [place] [address], [address]  #: [phone] Follow-up Instructions Return if symptoms worsen or fail to improve, for keep appt 5/24/18 if no better. Patient Instructions Nutrition Tips for Diabetes: After Your Visit Your Care Instructions A healthy diet is important to manage diabetes. It helps you lose weight (if you need to) and keep it off. It gives you the nutrition and energy your body needs and helps prevent heart disease. But a diet for diabetes does not mean that you have to eat special foods. You can eat what your family eats, including occasional sweets and other favorites. But you do have to pay attention to how often you eat and how much you eat of certain foods. The right plan for you will give you meals that help you keep your blood sugar at healthy levels. Try to eat a variety of foods and to spread carbohydrate throughout the day. Carbohydrate raises blood sugar higher and more quickly than any other nutrient does. Carbohydrate is found in sugar, breads and cereals, fruit, starchy vegetables such as potatoes and corn, and milk and yogurt. You may want to work with a dietitian or diabetes educator to help you plan meals and snacks. A dietitian or diabetes educator also can help you lose weight if that is one of your goals. The following tips can help you enjoy your meals and stay healthy. Follow-up care is a key part of your treatment and safety. Be sure to make and go to all appointments, and call your doctor if you are having problems. Its also a good idea to know your test results and keep a list of the medicines you take. How can you care for yourself at home? · Learn which foods have carbohydrate and how much carbohydrate to eat.  A dietitian or diabetes educator can help you learn to keep track of how much carbohydrate you eat. · Spread carbohydrate throughout the day. Eat some carbohydrate at all meals, but do not eat too much at any one time. · Plan meals to include food from all the food groups. These are the food groups and some example portion sizes: ¨ Grains: 1 slice of bread (1 ounce), ½ cup of cooked cereal, and 1/3 cup of cooked pasta or rice. These have about 15 grams of carbohydrate in a serving. Choose whole grains such as whole wheat bread or crackers, oatmeal, and brown rice more often than refined grains. ¨ Fruit: 1 small fresh fruit, such as an apple or orange; ½ of a banana; ½ cup of chopped, cooked, or canned fruit; ½ cup of fruit juice; 1 cup of melon or raspberries; and 2 tablespoons of dried fruit. These have about 15 grams of carbohydrate in a serving. ¨ Dairy: 1 cup of nonfat or low-fat milk and 2/3 cup of plain yogurt. These have about 15 grams of carbohydrate in a serving. ¨ Protein foods: Beef, chicken, turkey, fish, eggs, tofu, cheese, cottage cheese, and peanut butter. A serving size of meat is 3 ounces, which is about the size of a deck of cards. Examples of meat substitute serving sizes (equal to 1 ounce of meat) are 1/4 cup of cottage cheese, 1 egg, 1 tablespoon of peanut butter, and ½ cup of tofu. These have very little or no carbohydrate per serving. ¨ Vegetables: Starchy vegetables such as ½ cup of cooked dried beans, peas, potatoes, or corn have about 15 grams of carbohydrate. Nonstarchy vegetables have very little carbohydrate, such as 1 cup of raw leafy vegetables (such as spinach), ½ cup of other vegetables (cooked or chopped), and 3/4 cup of vegetable juice. · Use the plate format to plan meals. It is a good, quick way to make sure that you have a balanced meal. It also helps you spread carbohydrate throughout the day. You divide your plate by types of foods.  Put vegetables on half the plate, meat or meat substitutes on one-quarter of the plate, and a grain or starchy vegetable (such as brown rice or a potato) in the final quarter of the plate. To this you can add a small piece of fruit and 1 cup of milk or yogurt, depending on how much carbohydrate you are supposed to eat at a meal. 
· Talk to your dietitian or diabetes educator about ways to add limited amounts of sweets into your meal plan. You can eat these foods now and then, as long as you include the amount of carbohydrate they have in your daily carbohydrate allowance. · If you drink alcohol, limit it to no more than 1 drink a day for women and 2 drinks a day for men. If you are pregnant, no amount of alcohol is known to be safe. · Protein, fat, and fiber do not raise blood sugar as much as carbohydrate does. If you eat a lot of these nutrients in a meal, your blood sugar will rise more slowly than it would otherwise. · Limit saturated fats, such as those from meat and dairy products. Try to replace it with monounsaturated fat, such as olive oil. This is a healthier choice because people who have diabetes are at higher-than-average risk of heart disease. But use a modest amount of olive oil. A tablespoon of olive oil has 14 grams of fat and 120 calories. · Exercise lowers blood sugar. If you take insulin by shots or pump, you can use less than you would if you were not exercising. Keep in mind that timing matters. If you exercise within 1 hour after a meal, your body may need less insulin for that meal than it would if you exercised 3 hours after the meal. Test your blood sugar to find out how exercise affects your need for insulin. · Exercise on most days of the week. Aim for at least 30 minutes. Exercise helps you stay at a healthy weight and helps your body use insulin. Walking is an easy way to get exercise.  Gradually increase the amount you walk every day. You also may want to swim, bike, or do other activities. When you eat out · Learn to estimate the serving sizes of foods that have carbohydrate. If you measure food at home, it will be easier to estimate the amount in a serving of restaurant food. · If the meal you order has too much carbohydrate (such as potatoes, corn, or baked beans), ask to have a low-carbohydrate food instead. Ask for a salad or green vegetables. · If you use insulin, check your blood sugar before and after eating out to help you plan how much to eat in the future. · If you eat more carbohydrate at a meal than you had planned, take a walk or do other exercise. This will help lower your blood sugar. Where can you learn more? Go to FREECULTR.be Enter X283 in the search box to learn more about \"Nutrition Tips for Diabetes: After Your Visit. \"  
© 1365-7081 [employer], [employer]. Care instructions adapted under license by [facility] (which disclaims liability or warranty for this information). This care instruction is for use with your licensed healthcare professional. If you have questions about a medical condition or this instruction, always ask your healthcare professional. [place] [address] any warranty or liability for your use of this information. Content Version: 67.2.789450; Current as of: June 4, 2014 Diabetes Sick-Day Plan: Care Instructions Your Care Instructions If you have diabetes, many other illnesses can make your blood sugar go up. This can be dangerous. When you are sick with the flu or another illness, your body releases hormones to fight infection. These hormones raise blood sugar levels. They also make it hard for insulin or other medicines to lower your blood sugar. Work with your doctor to make a plan for what to do on days when you are sick. Follow-up care is a key part of your treatment and safety.  Be sure to make and go to all appointments, and call your doctor if you are having problems. It's also a good idea to know your test results and keep a list of the medicines you take. How can you care for yourself at home? · Work with your doctor to write up a sick-day plan for what to do on days when you are sick. Your blood sugar can go up or down, depending on your illness and whether you can keep food down. Call your doctor when you are sick. Ask if you need to adjust your pills or insulin. · Write down the diabetes medicines you have been taking and whether you have changed the dose based on your sick-day plan. Have this information ready when you call your doctor. · Eat your normal types and amounts of food. Drink extra fluids, such as water, broth, and fruit juice, to prevent dehydration. ¨ If your blood sugar level is higher than the blood sugar level your doctor recommends (for example, above 240 milligrams per deciliter [mg/dL]), drink extra liquids that do not contain sugar. Examples are water and sugar-free cola. ¨ If you can't eat your usual foods, drink extra liquids, such as soup, sports drinks, or milk. You may also eat food that is gentle on the stomach. These foods include crackers, gelatin dessert, and applesauce. Try to eat or drink 50 grams of carbohydrates every 3 to 4 hours. For example, 6 saltine crackers, 1 cup (8 ounces) of milk, and ½ cup (4 ounces) of orange juice each contain about 15 grams of carbohydrate. · Check your blood sugar at least every 3 to 4 hours. If it goes up fast, check it more often. And check it even through the night. Take insulin if your doctor told you to do so. If you and your doctor did not have a sick-day plan for taking extra insulin, call him or her for advice. · If you take insulin, check your urine or blood for ketones. This is even more important if your blood sugar is high.  
· Do not take any over-the-counter medicines, such as pain relievers, decongestants, or herbal products or other natural medicines, without talking with your doctor first. 
· Do not drive. If you need to see your doctor or go anywhere else, ask a family member or friend to drive you. When should you call for help? Call 911 anytime you think you may need emergency care. For example, call if: 
? · You passed out (lost consciousness). ? · You are confused or cannot think clearly. ? · Your blood sugar is very high or very low. ? Watch closely for changes in your health, and be sure to contact your doctor if: 
? · Your blood sugar stays outside the level your doctor set for you. ? · You have any problems. Where can you learn more? Go to http://chitra-gregg.info/. Enter Y718 in the search box to learn more about \"Diabetes Sick-Day Plan: Care Instructions. \" Current as of: March 13, 2017 Content Version: 11.4 © 6356-3988 [employer]. Care instructions adapted under license by [employer] [facility] (which disclaims liability or warranty for this information). If you have questions about a medical condition or this instruction, always ask your healthcare professional. [place] [address] any warranty or liability for your use of this information. Introducing [facility]! [facility] introduces [employer] patient portal. Now you can access parts of your medical record, email your doctor's office, and request medication refills online. 1. In your internet browser, go to [email or web address] 2. Click on the First Time User? Click Here link in the Sign In box. You will see the New Member Sign Up page. 3. Enter your [employer] Access Code exactly as it appears below. You will not need to use this code after youve completed the sign-up process. If you do not sign up before the expiration date, you must request a new code. · [employer] Access Code: [email or web address] Expires: 8/15/2018  2:22 PM 
 
 4. Enter the last four digits of your Social Security Number (xxxx) and Date of Birth (mm/dd/yyyy) as indicated and click Submit. You will be taken to the next sign-up page. 5. Create a [employer] ID. This will be your [employer] login ID and cannot be changed, so think of one that is secure and easy to remember. 6. Create a [employer] password. You can change your password at any time. 7. Enter your Password Reset Question and Answer. This can be used at a later time if you forget your password. 8. Enter your e-mail address. You will receive e-mail notification when new information is available in [address] 9. Click Sign Up. You can now view and download portions of your medical record. 10. Click the Download Summary menu link to download a portable copy of your medical information. If you have questions, please visit the Frequently Asked Questions section of the [employer] website. Remember, [employer] is NOT to be used for urgent needs. For medical emergencies, dial 911. Now available from your iPhone and Android! Please provide this summary of care documentation to your next provider. Your primary care clinician is listed as Deion Khalil. If you have any questions after today's visit, please call [phone].

## 2018-05-21 NOTE — PATIENT INSTRUCTIONS
Nutrition Tips for Diabetes: After Your Visit  Your Care Instructions  A healthy diet is important to manage diabetes. It helps you lose weight (if you need to) and keep it off. It gives you the nutrition and energy your body needs and helps prevent heart disease. But a diet for diabetes does not mean that you have to eat special foods. You can eat what your family eats, including occasional sweets and other favorites. But you do have to pay attention to how often you eat and how much you eat of certain foods. The right plan for you will give you meals that help you keep your blood sugar at healthy levels. Try to eat a variety of foods and to spread carbohydrate throughout the day. Carbohydrate raises blood sugar higher and more quickly than any other nutrient does. Carbohydrate is found in sugar, breads and cereals, fruit, starchy vegetables such as potatoes and corn, and milk and yogurt. You may want to work with a dietitian or diabetes educator to help you plan meals and snacks. A dietitian or diabetes educator also can help you lose weight if that is one of your goals. The following tips can help you enjoy your meals and stay healthy. Follow-up care is a key part of your treatment and safety. Be sure to make and go to all appointments, and call your doctor if you are having problems. Its also a good idea to know your test results and keep a list of the medicines you take. How can you care for yourself at home? · Learn which foods have carbohydrate and how much carbohydrate to eat. A dietitian or diabetes educator can help you learn to keep track of how much carbohydrate you eat. · Spread carbohydrate throughout the day. Eat some carbohydrate at all meals, but do not eat too much at any one time. · Plan meals to include food from all the food groups.  These are the food groups and some example portion sizes:  ¨ Grains: 1 slice of bread (1 ounce), ½ cup of cooked cereal, and 1/3 cup of cooked pasta or rice. These have about 15 grams of carbohydrate in a serving. Choose whole grains such as whole wheat bread or crackers, oatmeal, and brown rice more often than refined grains. ¨ Fruit: 1 small fresh fruit, such as an apple or orange; ½ of a banana; ½ cup of chopped, cooked, or canned fruit; ½ cup of fruit juice; 1 cup of melon or raspberries; and 2 tablespoons of dried fruit. These have about 15 grams of carbohydrate in a serving. ¨ Dairy: 1 cup of nonfat or low-fat milk and 2/3 cup of plain yogurt. These have about 15 grams of carbohydrate in a serving. ¨ Protein foods: Beef, chicken, turkey, fish, eggs, tofu, cheese, cottage cheese, and peanut butter. A serving size of meat is 3 ounces, which is about the size of a deck of cards. Examples of meat substitute serving sizes (equal to 1 ounce of meat) are 1/4 cup of cottage cheese, 1 egg, 1 tablespoon of peanut butter, and ½ cup of tofu. These have very little or no carbohydrate per serving. ¨ Vegetables: Starchy vegetables such as ½ cup of cooked dried beans, peas, potatoes, or corn have about 15 grams of carbohydrate. Nonstarchy vegetables have very little carbohydrate, such as 1 cup of raw leafy vegetables (such as spinach), ½ cup of other vegetables (cooked or chopped), and 3/4 cup of vegetable juice. · Use the plate format to plan meals. It is a good, quick way to make sure that you have a balanced meal. It also helps you spread carbohydrate throughout the day. You divide your plate by types of foods. Put vegetables on half the plate, meat or meat substitutes on one-quarter of the plate, and a grain or starchy vegetable (such as brown rice or a potato) in the final quarter of the plate. To this you can add a small piece of fruit and 1 cup of milk or yogurt, depending on how much carbohydrate you are supposed to eat at a meal.  · Talk to your dietitian or diabetes educator about ways to add limited amounts of sweets into your meal plan.  You can eat these foods now and then, as long as you include the amount of carbohydrate they have in your daily carbohydrate allowance. · If you drink alcohol, limit it to no more than 1 drink a day for women and 2 drinks a day for men. If you are pregnant, no amount of alcohol is known to be safe. · Protein, fat, and fiber do not raise blood sugar as much as carbohydrate does. If you eat a lot of these nutrients in a meal, your blood sugar will rise more slowly than it would otherwise. · Limit saturated fats, such as those from meat and dairy products. Try to replace it with monounsaturated fat, such as olive oil. This is a healthier choice because people who have diabetes are at higher-than-average risk of heart disease. But use a modest amount of olive oil. A tablespoon of olive oil has 14 grams of fat and 120 calories. · Exercise lowers blood sugar. If you take insulin by shots or pump, you can use less than you would if you were not exercising. Keep in mind that timing matters. If you exercise within 1 hour after a meal, your body may need less insulin for that meal than it would if you exercised 3 hours after the meal. Test your blood sugar to find out how exercise affects your need for insulin. · Exercise on most days of the week. Aim for at least 30 minutes. Exercise helps you stay at a healthy weight and helps your body use insulin. Walking is an easy way to get exercise. Gradually increase the amount you walk every day. You also may want to swim, bike, or do other activities. When you eat out  · Learn to estimate the serving sizes of foods that have carbohydrate. If you measure food at home, it will be easier to estimate the amount in a serving of restaurant food. · If the meal you order has too much carbohydrate (such as potatoes, corn, or baked beans), ask to have a low-carbohydrate food instead. Ask for a salad or green vegetables.   · If you use insulin, check your blood sugar before and after eating out to help you plan how much to eat in the future. · If you eat more carbohydrate at a meal than you had planned, take a walk or do other exercise. This will help lower your blood sugar. Where can you learn more? Go to Eggs Overnight.be  Enter T174 in the search box to learn more about \"Nutrition Tips for Diabetes: After Your Visit. \"   © 9490-7200 [employer], [employer]. Care instructions adapted under license by [address] [place] [address] (which disclaims liability or warranty for this information). This care instruction is for use with your licensed healthcare professional. If you have questions about a medical condition or this instruction, always ask your healthcare professional. [place] [address] any warranty or liability for your use of this information. Content Version: 69.2.121604; Current as of: June 4, 2014                 Diabetes Sick-Day Plan: Care Instructions  Your Care Instructions    If you have diabetes, many other illnesses can make your blood sugar go up. This can be dangerous. When you are sick with the flu or another illness, your body releases hormones to fight infection. These hormones raise blood sugar levels. They also make it hard for insulin or other medicines to lower your blood sugar. Work with your doctor to make a plan for what to do on days when you are sick. Follow-up care is a key part of your treatment and safety. Be sure to make and go to all appointments, and call your doctor if you are having problems. It's also a good idea to know your test results and keep a list of the medicines you take. How can you care for yourself at home? · Work with your doctor to write up a sick-day plan for what to do on days when you are sick. Your blood sugar can go up or down, depending on your illness and whether you can keep food down. Call your doctor when you are sick. Ask if you need to adjust your pills or insulin.   · Write down the diabetes medicines you have been taking and whether you have changed the dose based on your sick-day plan. Have this information ready when you call your doctor. · Eat your normal types and amounts of food. Drink extra fluids, such as water, broth, and fruit juice, to prevent dehydration. ¨ If your blood sugar level is higher than the blood sugar level your doctor recommends (for example, above 240 milligrams per deciliter [mg/dL]), drink extra liquids that do not contain sugar. Examples are water and sugar-free cola. ¨ If you can't eat your usual foods, drink extra liquids, such as soup, sports drinks, or milk. You may also eat food that is gentle on the stomach. These foods include crackers, gelatin dessert, and applesauce. Try to eat or drink 50 grams of carbohydrates every 3 to 4 hours. For example, 6 saltine crackers, 1 cup (8 ounces) of milk, and ½ cup (4 ounces) of orange juice each contain about 15 grams of carbohydrate. · Check your blood sugar at least every 3 to 4 hours. If it goes up fast, check it more often. And check it even through the night. Take insulin if your doctor told you to do so. If you and your doctor did not have a sick-day plan for taking extra insulin, call him or her for advice. · If you take insulin, check your urine or blood for ketones. This is even more important if your blood sugar is high. · Do not take any over-the-counter medicines, such as pain relievers, decongestants, or herbal products or other natural medicines, without talking with your doctor first.  · Do not drive. If you need to see your doctor or go anywhere else, ask a family member or friend to drive you. When should you call for help? Call 911 anytime you think you may need emergency care. For example, call if:  ? · You passed out (lost consciousness). ? · You are confused or cannot think clearly. ? · Your blood sugar is very high or very low. ? Watch closely for changes in your health, and be sure to contact your doctor if:  ? · Your blood sugar stays outside the level your doctor set for you. ? · You have any problems. Where can you learn more? Go to http://chitra-gregg.info/. Enter Z055 in the search box to learn more about \"Diabetes Sick-Day Plan: Care Instructions. \"  Current as of: March 13, 2017  Content Version: 11.4  © 1916-4310 [employer]. Care instructions adapted under license by [employer] (which disclaims liability or warranty for this information). If you have questions about a medical condition or this instruction, always ask your healthcare professional. [place] [address] any warranty or liability for your use of this information.

## 2018-05-21 NOTE — LETTER
NOTIFICATION OF RETURN TO WORK / SCHOOL 
 
5/21/2018 Ms. Eduardo Delgado [address] [id number] To Whom It May Concern: 
 
Eduardo Delgado was evaluated by me today for a medical illness. She will return to work 5/23/18 if symptoms have improved. If there are questions or concerns please have the patient contact our office. Sincerely, Gabino Tolbert NP

## 2018-05-24 ENCOUNTER — OFFICE VISIT (OUTPATIENT)
Dept: INTERNAL MEDICINE CLINIC | Age: 38
End: 2018-05-24

## 2018-05-24 VITALS
SYSTOLIC BLOOD PRESSURE: 132 MMHG | WEIGHT: 163.5 LBS | TEMPERATURE: 98.1 F | RESPIRATION RATE: 16 BRPM | DIASTOLIC BLOOD PRESSURE: 88 MMHG | HEIGHT: 65 IN | BODY MASS INDEX: 27.24 KG/M2 | HEART RATE: 88 BPM

## 2018-05-24 DIAGNOSIS — E10.9 TYPE 1 DIABETES MELLITUS WITHOUT COMPLICATION (HCC): Primary | ICD-10-CM

## 2018-05-24 DIAGNOSIS — I10 ESSENTIAL HYPERTENSION: ICD-10-CM

## 2018-05-24 DIAGNOSIS — J30.1 SEASONAL ALLERGIC RHINITIS DUE TO POLLEN: ICD-10-CM

## 2018-05-24 DIAGNOSIS — J45.909 MILD ASTHMA WITHOUT COMPLICATION, UNSPECIFIED WHETHER PERSISTENT: ICD-10-CM

## 2018-05-24 LAB
MICROALBUMIN UR TEST STR-MCNC: 150 MG/L
MICROALBUMIN/CREAT RATIO POC: >300 MG/G

## 2018-05-24 RX ORDER — LOSARTAN POTASSIUM 25 MG/1
25 TABLET ORAL DAILY
Qty: 30 TAB | Refills: 12
Start: 2018-05-24 | End: 2021-06-09

## 2018-05-24 NOTE — PATIENT INSTRUCTIONS
Updox Activation    Thank you for requesting access to Updox. Please follow the instructions below to securely access and download your online medical record. Updox allows you to send messages to your doctor, view your test results, renew your prescriptions, schedule appointments, and more. How Do I Sign Up? 1. In your internet browser, go to [email or web address]  2. Click on the First Time User? Click Here link in the Sign In box. You will be redirect to the New Member Sign Up page. 3. Enter your Updox Access Code exactly as it appears below. You will not need to use this code after youve completed the sign-up process. If you do not sign up before the expiration date, you must request a new code. Updox Access Code: [email or web address]  Expires: 8/15/2018  2:22 PM (This is the date your Updox access code will )    4. Enter the last four digits of your Social Security Number (xxxx) and Date of Birth (mm/dd/yyyy) as indicated and click Submit. You will be taken to the next sign-up page. 5. Create a Updox ID. This will be your Updox login ID and cannot be changed, so think of one that is secure and easy to remember. 6. Create a Updox password. You can change your password at any time. 7. Enter your Password Reset Question and Answer. This can be used at a later time if you forget your password. 8. Enter your e-mail address. You will receive e-mail notification when new information is available in [address]. 9. Click Sign Up. You can now view and download portions of your medical record. 10. Click the Download Summary menu link to download a portable copy of your medical information. Additional Information    If you have questions, please visit the Frequently Asked Questions section of the Updox website at [email or web address] Remember, Updox is NOT to be used for urgent needs. For medical emergencies, dial 911.

## 2018-05-24 NOTE — PROGRESS NOTES
Chief Complaint   Patient presents with    Diabetes    Headache     1. Have you been to the ER, urgent care clinic since your last visit? Hospitalized since your last visit? No    2. Have you seen or consulted any other health care providers outside of the [address] [place] [address] since your last visit? Include any pap smears or colon screening. No  PHQ over the last two weeks 5/24/2018   PHQ Not Done -   Little interest or pleasure in doing things Nearly every day   Feeling down, depressed or hopeless Nearly every day   Total Score PHQ 2 6     Learning Assessment 5/24/2018   PRIMARY LEARNER Patient   HIGHEST LEVEL OF EDUCATION - PRIMARY LEARNER  DID NOT GRADUATE HIGH SCHOOL   BARRIERS PRIMARY LEARNER NONE   PRIMARY LANGUAGE ENGLISH   LEARNER PREFERENCE PRIMARY OTHER (COMMENT)   ANSWERED BY patient   RELATIONSHIP SELF     Provider notified of positive depression screening.

## 2018-05-24 NOTE — PROGRESS NOTES
Lilly Phillips is a 30 y.o. female and presents with Diabetes and Headache  . Subjective:    She had a recent allergy with hives reported,she states her hives have cleared  She is to see allergy doctor soon      Hypertension Review:  The patient has essential hypertension,states bp has been erratic  Diet and Lifestyle: generally follows a  low sodium diet, exercises sporadically  Home BP Monitoring: is not measured at home. Pertinent ROS: taking medications as instructed, no medication side effects noted, no TIA's, no chest pain on exertion, no dyspnea on exertion, no swelling of ankles. Asthma Review:  The patient is being seen for follow up of asthma,  currently stable. Asthma symptoms occur: infrequently. Wheezing when present is described as mild and easily relieved with rescue bronchodilator. The patient reports use of a steroid inhaler. Frequency of use of quick-relief meds: rarely. Regimen compliance: The patient reports adherence to this regimen. Diabetes Mellitus Review:  She has diabetes mellitus. Diabetic ROS - medication compliance: noncompliant all of the time, diabetic diet noncompliance: compliant all of the time, home glucose monitoring: is performed. Known diabetic complications: none  Cardiovascular risk factors: family history, dyslipidemia, diabetes mellitus, obesity, hypertension  Current diabetic medications include insulin   Eye exam current (within one year): no  Weight trend: stable  Prior visit with dietician: no  Current diet: \"healthy\" diet  in general  Current exercise: walking  Current monitoring regimen: home blood tests - daily  Home blood sugar records: trend: stable  Any episodes of hypoglycemia? no  Is She on ACE inhibitor or angiotensin II receptor blocker? Yes     Allergic Rhinitis  Patient presents for evaluation of allergic symptoms. Symptoms include nasal congestion, rhinorrhea, sneezing, eye itching, watery eyes.  Precipitants haved included possible pollen. Review of Systems  Constitutional: negative for fevers, chills, anorexia and weight loss  Eyes:   negative for visual disturbance and irritation  ENT:   negative for tinnitus,sore throat,nasal congestion,ear pains. hoarseness  Respiratory:  negative for cough, hemoptysis, dyspnea,wheezing  CV:   negative for chest pain, palpitations, lower extremity edema  GI:   negative for nausea, vomiting, diarrhea, abdominal pain,melena  Endo:               negative for polyuria,polydipsia,polyphagia,heat intolerance  Genitourinary: negative for frequency, dysuria and hematuria  Integument:  negative for rash and pruritus  Hematologic:  negative for easy bruising and gum/nose bleeding  Musculoskel: negative for myalgias, arthralgias, back pain, muscle weakness, joint pain  Neurological:  negative for headaches, dizziness, vertigo, memory problems and gait   Behavl/Psych: negative for feelings of anxiety, depression, mood changes    Past Medical History:   Diagnosis Date    Asthma     Diabetes ([facility] [address] 1991    Type 1    Headache      Past Surgical History:   Procedure Laterality Date    HX  SECTION  2007    HX GYN  2010    L ectopic pregnancy, L fallopian tube removed     Social History     Social History    Marital status: SINGLE     Spouse name: N/A    Number of children: N/A    Years of education: N/A     Social History Main Topics    Smoking status: Never Smoker    Smokeless tobacco: Never Used    Alcohol use No    Drug use: No    Sexual activity: Yes     Partners: Male     Birth control/ protection: None     Other Topics Concern    None     Social History Narrative     Family History   Problem Relation Age of Onset    Diabetes Mother     Hypertension Mother     Diabetes Father     Hypertension Father      Current Outpatient Prescriptions   Medication Sig Dispense Refill    losartan (COZAAR) 25 mg tablet Take 1 Tab by mouth daily.  30 Tab 12    glucose blood VI test strips (TRUETRACK TEST) strip Check blood glucose 2-3 times a day 100 Strip 3    montelukast (SINGULAIR) 10 mg tablet Take 1 Tab by mouth daily. 30 Tab 12    Insulin Needles, Disposable, 29 gauge x 1/2\" ndle Use as directed 100 Pen Needle 12    EPINEPHrine (EPIPEN 2-SUMAYA) 0.3 mg/0.3 mL injection 0.3 mL by IntraMUSCular route once as needed for up to 1 dose. 2 Syringe 3    Insulin Syringe-Needle U-100 (BD INSULIN SYRINGE SAFETYGLIDE) 0.5 mL 29 gauge x 1/2\" syrg Use with insulin 120 Syringe 3    sucralfate (CARAFATE) 1 gram tablet Take 1 Tab by mouth four (4) times daily. 120 Tab 12    albuterol (PROVENTIL HFA, VENTOLIN HFA, PROAIR HFA) 90 mcg/actuation inhaler Take 2 Puffs by inhalation every four (4) hours as needed for Wheezing. 1 Inhaler 12    insulin NPH (NOVOLIN N, HUMULIN N) 100 unit/mL injection 30 units am,30 units pm 3 vial 12    insulin regular (NOVOLIN R, HUMULIN R) 100 unit/mL injection 20 units am 20 units noon 20 units pm 3 vial 12    glucose blood VI test strips (ASCENSIA CONTOUR) strip Test blood sugar 3 times daily 100 Strip 12    Blood-Glucose Meter (CONTOUR METER) monitoring kit Use as directed 1 Kit 0    Lancets Misc As directed 1 Package 11    codeine-butalbital-aspirin-caffeine (FIORINAL WITH CODEINE) 75--40 mg per capsule Take 1 Cap by mouth every eight (8) hours as needed for Pain. 30 Cap 0    insulin regular (NOVOLIN R REGULAR U-100 INSULN) 100 unit/mL injection by SubCUTAneous route.  insulin regular (NOVOLIN R) 100 unit/mL injection 20 units am,lunch,dinner 3 Vial 3    ipratropium (ATROVENT) 0.06 % nasal spray 2 Sprays by Both Nostrils route four (4) times daily. 15 mL 12    triamcinolone acetonide (KENALOG) 0.5 % ointment Apply  to affected area two (2) times a day. use thin layer 30 g 3    insulin aspart (NOVOLOG) 100 unit/mL injection by SubCUTAneous route.       insulin (NOVOLOG 70-30 MIX) 100 unit/mL (70-30) flex pen 30 units bid 1 Package 12    clobetasol (TEMOVATE) 0.05 % ointment Apply  to affected area two (2) times a day. 15 g 0     Allergies   Allergen Reactions    Doxycycline Hives       Objective:  Visit Vitals    /88 (BP 1 Location: Right arm, BP Patient Position: Sitting)    Pulse 88    Temp 98.1 °F (36.7 °C) (Oral)    Resp 16    Ht 5' 5\" (1.651 m)    Wt 163 lb 8 oz (74.2 kg)    LMP  (LMP Unknown)    BMI 27.21 kg/m2     Physical Exam:   General appearance - alert, well appearing, and in no distress  Mental status - alert, oriented to person, place, and time  EYE-HARRY, EOMI, corneas normal, no foreign bodies  ENT-ENT exam normal, no neck nodes or sinus tenderness  Nose - normal and patent, no erythema, discharge or polyps  Mouth - mucous membranes moist, pharynx normal without lesions  Neck - supple, no significant adenopathy   Chest - clear to auscultation, no wheezes, rales or rhonchi, symmetric air entry   Heart - normal rate, regular rhythm, normal S1, S2, no murmurs, rubs, clicks or gallops   Abdomen - soft, nontender, nondistended, no masses or organomegaly  Lymph- no adenopathy palpable  Ext-peripheral pulses normal, no pedal edema, no clubbing or cyanosis  Skin-Warm and dry. no hyperpigmentation, vitiligo, or suspicious lesions  Neuro -alert, oriented, normal speech, no focal findings or movement disorder noted  Neck-normal C-spine, no tenderness, full ROM without pain      Results for orders placed or performed in visit on 05/17/18   AMB POC LIPID PROFILE   Result Value Ref Range    Cholesterol (POC) 229     Triglycerides (POC) 93     HDL Cholesterol (POC) 91     Non-HDL Cholesterol 138     LDL Cholesterol (POC) 120 MG/DL    TChol/HDL Ratio (POC) 2.5    AMB POC HEMOGLOBIN A1C   Result Value Ref Range    Hemoglobin A1c (POC) 12.2 %   AMB POC GLUCOSE BLOOD, BY GLUCOSE MONITORING DEVICE   Result Value Ref Range    Glucose  mg/dL       Assessment/Plan:    ICD-10-CM ICD-9-CM    1.  Type 1 diabetes mellitus without complication (HCC) E10.9 250.01 REFERRAL TO ENDOCRINOLOGY      METABOLIC PANEL, COMPREHENSIVE      CBC W/O DIFF      REFERRAL TO OPHTHALMOLOGY      AMB POC URINE, MICROALBUMIN, SEMIQUANTITATIVE   2. Mild asthma without complication, unspecified whether persistent J45.909 493.90    3. Essential hypertension I10 401.9    4. Seasonal allergic rhinitis due to pollen J30.1 477.0      Orders Placed This Encounter    METABOLIC PANEL, COMPREHENSIVE    CBC W/O DIFF    REFERRAL TO ENDOCRINOLOGY     Referral Priority:   Routine     Referral Type:   Consultation     Referral Reason:   Specialty Services Required     Referred to Provider:   Reny Leon MD     Requested Specialty:   Endocrinology    REFERRAL TO OPHTHALMOLOGY     Referral Priority:   Routine     Referral Type:   Consultation     Referral Reason:   Specialty Services Required     Referral Location:   [employer]     Referred to Provider:   Syed Arellano MD    AMB POC URINE, MICROALBUMIN, SEMIQUANTITATIVE    losartan (COZAAR) 25 mg tablet     Sig: Take 1 Tab by mouth daily. Dispense:  30 Tab     Refill:  12     routine labs ordered,Take 81mg aspirin daily  She needs an insulin pump trial  Patient Instructions   Fisgo Activation    Thank you for requesting access to Fisgo. Please follow the instructions below to securely access and download your online medical record. Fisgo allows you to send messages to your doctor, view your test results, renew your prescriptions, schedule appointments, and more. How Do I Sign Up? 1. In your internet browser, go to [email or web address]  2. Click on the First Time User? Click Here link in the Sign In box. You will be redirect to the New Member Sign Up page. 3. Enter your Fisgo Access Code exactly as it appears below. You will not need to use this code after youve completed the sign-up process. If you do not sign up before the expiration date, you must request a new code.     Fisgo Access Code: [email or web address]  Expires: 8/15/2018  2:22 PM (This is the date your Parcel access code will )    4. Enter the last four digits of your Social Security Number (xxxx) and Date of Birth (mm/dd/yyyy) as indicated and click Submit. You will be taken to the next sign-up page. 5. Create a HelpSaÃºde.comt ID. This will be your Parcel login ID and cannot be changed, so think of one that is secure and easy to remember. 6. Create a HelpSaÃºde.comt password. You can change your password at any time. 7. Enter your Password Reset Question and Answer. This can be used at a later time if you forget your password. 8. Enter your e-mail address. You will receive e-mail notification when new information is available in [address] 9. Click Sign Up. You can now view and download portions of your medical record. 10. Click the Download Summary menu link to download a portable copy of your medical information. Additional Information    If you have questions, please visit the Frequently Asked Questions section of the Parcel website at [email or web address] Remember, Parcel is NOT to be used for urgent needs. For medical emergencies, dial 911. Follow-up Disposition:  Return in about 4 weeks (around 2018), or if symptoms worsen or fail to improve. I have reviewed with the patient details of the assessment and plan and all questions were answered. Relevent patient education was performed    An After Visit Summary was printed and given to the patient.

## 2018-05-24 NOTE — MR AVS SNAPSHOT
[address] [facility] [address] 
[phone] Patient: Norm Nicole MRN:  [id number] Visit Information Date & Time Provider Department Dept. Phone Encounter #  
 5/24/2018  9:15 AM Chris Luna MD [facility] [phone] [id number] Follow-up Instructions Return in about 4 weeks (around 6/21/2018), or if symptoms worsen or fail to improve. Upcoming Health Maintenance Date Due Pneumococcal 19-64 Medium Risk (1 of 1 - PPSV23) 7/31/2001 EYE EXAM RETINAL OR DILATED Q1 10/16/2015 MICROALBUMIN Q1 9/28/2016 PAP AKA CERVICAL CYTOLOGY 11/30/2016 DTaP/Tdap/Td series (1 - Tdap) 8/22/2018* Influenza Age 5 to Adult 8/1/2018 HEMOGLOBIN A1C Q6M 11/17/2018 LIPID PANEL Q1 5/17/2019 FOOT EXAM Q1 5/24/2019 *Topic was postponed. The date shown is not the original due date. Allergies as of 5/24/2018  Review Complete On: 5/24/2018 By: Chris Luna MD  
  
 Severity Noted Reaction Type Reactions Doxycycline  03/27/2011    Hives Current Immunizations  Never Reviewed No immunizations on file. Not reviewed this visit You Were Diagnosed With   
  
 Codes Comments Type 1 diabetes mellitus without complication (HCC)    -  Primary ICD-10-CM: E10.9 ICD-9-CM: 250.01 Mild asthma without complication, unspecified whether persistent     ICD-10-CM: J45.909 ICD-9-CM: 493.90 Essential hypertension     ICD-10-CM: I10 
ICD-9-CM: 401.9 Seasonal allergic rhinitis due to pollen     ICD-10-CM: J30.1 ICD-9-CM: 477.0 Vitals BP Pulse Temp Resp Height(growth percentile) Weight(growth percentile) 132/88 (BP 1 Location: Right arm, BP Patient Position: Sitting) 88 98.1 °F (36.7 °C) (Oral) 16 5' 5\" (1.651 m) 163 lb 8 oz (74.2 kg) LMP BMI OB Status Smoking Status (LMP Unknown) 27.21 kg/m2 Having regular periods Never Smoker Vitals History BMI and BSA Data Body Mass Index Body Surface Area  
 27.21 kg/m 2 1.84 m 2 Preferred Pharmacy Pharmacy Name Phone [facility] [place], [address] [phone] Your Updated Medication List  
  
   
This list is accurate as of 5/24/18  9:41 AM.  Always use your most recent med list.  
  
  
  
  
 albuterol 90 mcg/actuation inhaler Commonly known as:  PROVENTIL HFA, VENTOLIN HFA, PROAIR HFA Take 2 Puffs by inhalation every four (4) hours as needed for Wheezing. Blood-Glucose Meter monitoring kit Commonly known as:  CONTOUR METER Use as directed  
  
 clobetasol 0.05 % ointment Commonly known as:  Regenia Magalis Apply  to affected area two (2) times a day. codeine-butalbital-aspirin-caffeine 87--40 mg per capsule Commonly known as:  [address] Take 1 Cap by mouth every eight (8) hours as needed for Pain. EPINEPHrine 0.3 mg/0.3 mL injection Commonly known as:  EPIPEN 2-SUMAYA  
0.3 mL by IntraMUSCular route once as needed for up to 1 dose. * glucose blood VI test strips strip Commonly known as:  Ascensia CONTOUR Test blood sugar 3 times daily * glucose blood VI test strips strip Commonly known as:  TRUETRACK TEST Check blood glucose 2-3 times a day  
  
 insulin aspart protamine/insulin aspart 100 unit/mL (70-30) Inpn Commonly known as:  NOVOLOG MIX 70/30  
30 units bid Insulin Needles (Disposable) 29 gauge x 1/2\" Ndle Use as directed  
  
 insulin  unit/mL injection Commonly known as:  NOVOLIN N, HUMULIN N  
30 units am,30 units pm  
  
 * NovoLIN R Regular U-100 Insuln 100 unit/mL injection Generic drug:  insulin regular  
by SubCUTAneous route. * insulin regular 100 unit/mL injection Commonly known as:  ZO Elliott  
 20 units am 20 units noon 20 units pm  
  
 * insulin regular 100 unit/mL injection Commonly known as:  NovoLIN R Regular U-100 Insuln 20 units am,lunch,dinner Insulin Syringe-Needle U-100 0.5 mL 29 gauge x 1/2\" Syrg Commonly known as:  BD Insulin Syringe SafetyGlide Use with insulin  
  
 ipratropium 42 mcg (0.06 %) nasal spray Commonly known as:  ATROVENT  
2 Sprays by Both Nostrils route four (4) times daily. Lancets Misc As directed  
  
 losartan 25 mg tablet Commonly known as:  COZAAR Take 1 Tab by mouth daily. montelukast 10 mg tablet Commonly known as:  SINGULAIR Take 1 Tab by mouth daily. NovoLOG U-100 Insulin aspart 100 unit/mL injection Generic drug:  insulin aspart U-100  
by SubCUTAneous route. sucralfate 1 gram tablet Commonly known as:  Sonia Bushy Take 1 Tab by mouth four (4) times daily. triamcinolone acetonide 0.5 % ointment Commonly known as:  KENALOG Apply  to affected area two (2) times a day. use thin layer * Notice: This list has 5 medication(s) that are the same as other medications prescribed for you. Read the directions carefully, and ask your doctor or other care provider to review them with you. We Performed the Following AMB POC URINE, MICROALBUMIN, SEMIQUANTITATIVE [62269 CPT(R)] CBC W/O DIFF [85384 CPT(R)] METABOLIC PANEL, COMPREHENSIVE [85525 CPT(R)] REFERRAL TO ENDOCRINOLOGY [XCR06 Custom] REFERRAL TO OPHTHALMOLOGY [REF57 Custom] Follow-up Instructions Return in about 4 weeks (around 6/21/2018), or if symptoms worsen or fail to improve. Referral Information Referral ID Referred By Referred To  
  
 [id number] Cierra [place], Stefania Hogan MD   
   [facility] [address] [place], [address] Phone: [phone] Fax: [phone] Visits Status Start Date End Date 1 New Request 5/24/18 5/24/19 If your referral has a status of pending review or denied, additional information will be sent to support the outcome of this decision. Referral ID Referred By Referred To  
 [id number] Kit Hassan [facility] [address] [place], [address] Visits Status Start Date End Date 1 New Request 18 If your referral has a status of pending review or denied, additional information will be sent to support the outcome of this decision. Patient Instructions MyChart Activation Thank you for requesting access to Beisen. Please follow the instructions below to securely access and download your online medical record. Beisen allows you to send messages to your doctor, view your test results, renew your prescriptions, schedule appointments, and more. How Do I Sign Up? 1. In your internet browser, go to [email or web address] 
2. Click on the First Time User? Click Here link in the Sign In box. You will be redirect to the New Member Sign Up page. 3. Enter your Beisen Access Code exactly as it appears below. You will not need to use this code after youve completed the sign-up process. If you do not sign up before the expiration date, you must request a new code. Beisen Access Code: [email or web address] Expires: 8/15/2018  2:22 PM (This is the date your Beisen access code will ) 4. Enter the last four digits of your Social Security Number (xxxx) and Date of Birth (mm/dd/yyyy) as indicated and click Submit. You will be taken to the next sign-up page. 5. Create a Beisen ID. This will be your Beisen login ID and cannot be changed, so think of one that is secure and easy to remember. 6. Create a Beisen password. You can change your password at any time. 7. Enter your Password Reset Question and Answer. This can be used at a later time if you forget your password. 8. Enter your e-mail address.  You will receive e-mail notification when new information is available in [employer]. 9. Click Sign Up. You can now view and download portions of your medical record. 10. Click the Download Summary menu link to download a portable copy of your medical information. Additional Information If you have questions, please visit the Frequently Asked Questions section of the YaSabe website at [email or web address] Remember, YaSabe is NOT to be used for urgent needs. For medical emergencies, dial 911. Introducing [facility]! [facility] introduces YaSabe patient portal. Now you can access parts of your medical record, email your doctor's office, and request medication refills online. 1. In your internet browser, go to [email or web address] 2. Click on the First Time User? Click Here link in the Sign In box. You will see the New Member Sign Up page. 3. Enter your YaSabe Access Code exactly as it appears below. You will not need to use this code after youve completed the sign-up process. If you do not sign up before the expiration date, you must request a new code. · YaSabe Access Code: [email or web address] Expires: 8/15/2018  2:22 PM 
 
4. Enter the last four digits of your Social Security Number (xxxx) and Date of Birth (mm/dd/yyyy) as indicated and click Submit. You will be taken to the next sign-up page. 5. Create a YaSabe ID. This will be your YaSabe login ID and cannot be changed, so think of one that is secure and easy to remember. 6. Create a YaSabe password. You can change your password at any time. 7. Enter your Password Reset Question and Answer. This can be used at a later time if you forget your password. 8. Enter your e-mail address. You will receive e-mail notification when new information is available in [address] 9. Click Sign Up. You can now view and download portions of your medical record.  
10. Click the Download Summary menu link to download a portable copy of your medical information. If you have questions, please visit the Frequently Asked Questions section of the Cloubrain website. Remember, Cloubrain is NOT to be used for urgent needs. For medical emergencies, dial 911. Now available from your iPhone and Android! Please provide this summary of care documentation to your next provider. Your primary care clinician is listed as Soledad Leigh. If you have any questions after today's visit, please call [phone].

## 2018-05-25 LAB
ALBUMIN SERPL-MCNC: 3.8 G/DL (ref 3.5–5.5)
ALBUMIN/GLOB SERPL: 1.2 {RATIO} (ref 1.2–2.2)
ALP SERPL-CCNC: 221 IU/L (ref 39–117)
ALT SERPL-CCNC: 121 IU/L (ref 0–32)
AST SERPL-CCNC: 65 IU/L (ref 0–40)
BILIRUB SERPL-MCNC: <0.2 MG/DL (ref 0–1.2)
BUN SERPL-MCNC: 10 MG/DL (ref 6–20)
BUN/CREAT SERPL: 13 (ref 9–23)
CALCIUM SERPL-MCNC: 9.6 MG/DL (ref 8.7–10.2)
CHLORIDE SERPL-SCNC: 100 MMOL/L (ref 96–106)
CO2 SERPL-SCNC: 26 MMOL/L (ref 18–29)
CREAT SERPL-MCNC: 0.75 MG/DL (ref 0.57–1)
ERYTHROCYTE [DISTWIDTH] IN BLOOD BY AUTOMATED COUNT: 14 % (ref 12.3–15.4)
GFR SERPLBLD CREATININE-BSD FMLA CKD-EPI: 104 ML/MIN/1.73
GFR SERPLBLD CREATININE-BSD FMLA CKD-EPI: 119 ML/MIN/1.73
GLOBULIN SER CALC-MCNC: 3.2 G/DL (ref 1.5–4.5)
GLUCOSE SERPL-MCNC: 264 MG/DL (ref 65–99)
HCT VFR BLD AUTO: 46 % (ref 34–46.6)
HGB BLD-MCNC: 14.9 G/DL (ref 11.1–15.9)
MCH RBC QN AUTO: 30 PG (ref 26.6–33)
MCHC RBC AUTO-ENTMCNC: 32.4 G/DL (ref 31.5–35.7)
MCV RBC AUTO: 93 FL (ref 79–97)
PLATELET # BLD AUTO: 408 X10E3/UL (ref 150–379)
POTASSIUM SERPL-SCNC: 4.8 MMOL/L (ref 3.5–5.2)
PROT SERPL-MCNC: 7 G/DL (ref 6–8.5)
RBC # BLD AUTO: 4.96 X10E6/UL (ref 3.77–5.28)
SODIUM SERPL-SCNC: 139 MMOL/L (ref 134–144)
WBC # BLD AUTO: 8.7 X10E3/UL (ref 3.4–10.8)

## 2018-06-13 ENCOUNTER — TELEPHONE (OUTPATIENT)
Dept: INTERNAL MEDICINE CLINIC | Age: 38
End: 2018-06-13

## 2018-06-15 RX ORDER — INSULIN PUMP SYRINGE, 3 ML
EACH MISCELLANEOUS
Qty: 1 KIT | Refills: 0 | Status: SHIPPED | OUTPATIENT
Start: 2018-06-15

## 2018-06-21 ENCOUNTER — OFFICE VISIT (OUTPATIENT)
Dept: INTERNAL MEDICINE CLINIC | Age: 38
End: 2018-06-21

## 2018-06-21 VITALS
BODY MASS INDEX: 26.99 KG/M2 | DIASTOLIC BLOOD PRESSURE: 60 MMHG | HEART RATE: 87 BPM | WEIGHT: 162 LBS | OXYGEN SATURATION: 98 % | SYSTOLIC BLOOD PRESSURE: 100 MMHG | RESPIRATION RATE: 20 BRPM | HEIGHT: 65 IN | TEMPERATURE: 97.8 F

## 2018-06-21 DIAGNOSIS — J30.1 SEASONAL ALLERGIC RHINITIS DUE TO POLLEN: ICD-10-CM

## 2018-06-21 DIAGNOSIS — I10 ESSENTIAL HYPERTENSION: ICD-10-CM

## 2018-06-21 DIAGNOSIS — J45.20 MILD INTERMITTENT ASTHMA WITHOUT COMPLICATION: ICD-10-CM

## 2018-06-21 DIAGNOSIS — Z79.4 CONTROLLED TYPE 2 DIABETES MELLITUS WITHOUT COMPLICATION, WITH LONG-TERM CURRENT USE OF INSULIN (HCC): Primary | ICD-10-CM

## 2018-06-21 DIAGNOSIS — E11.9 CONTROLLED TYPE 2 DIABETES MELLITUS WITHOUT COMPLICATION, WITH LONG-TERM CURRENT USE OF INSULIN (HCC): Primary | ICD-10-CM

## 2018-06-21 PROBLEM — E11.21 TYPE 2 DIABETES WITH NEPHROPATHY (HCC): Status: ACTIVE | Noted: 2018-06-21

## 2018-06-21 LAB — GLUCOSE POC: 113 MG/DL

## 2018-06-21 RX ORDER — ALBUTEROL SULFATE 90 UG/1
2 AEROSOL, METERED RESPIRATORY (INHALATION)
Qty: 1 INHALER | Refills: 12 | Status: SHIPPED | OUTPATIENT
Start: 2018-06-21

## 2018-06-21 NOTE — MR AVS SNAPSHOT
Jacob Soto 
 
 
 [address] [facility] [place] [id number] 
[phone] Patient: Deanne Ho MRN:  [id number] Visit Information Date & Time Provider Department Dept. Phone Encounter #  
 6/21/2018 12:00 PM Savana Roa MD [address] [facility] [phone] [id number] Follow-up Instructions Return in about 4 weeks (around 7/19/2018), or if symptoms worsen or fail to improve. Upcoming Health Maintenance Date Due Pneumococcal 19-64 Medium Risk (1 of 1 - PPSV23) 7/31/2001 EYE EXAM RETINAL OR DILATED Q1 10/16/2015 PAP AKA CERVICAL CYTOLOGY 11/30/2016 DTaP/Tdap/Td series (1 - Tdap) 8/22/2018* Influenza Age 5 to Adult 8/1/2018 HEMOGLOBIN A1C Q6M 11/17/2018 LIPID PANEL Q1 5/17/2019 FOOT EXAM Q1 5/24/2019 MICROALBUMIN Q1 5/24/2019 *Topic was postponed. The date shown is not the original due date. Allergies as of 6/21/2018  Review Complete On: 6/21/2018 By: Donovan Yun LPN Severity Noted Reaction Type Reactions Doxycycline  03/27/2011    Hives Current Immunizations  Never Reviewed No immunizations on file. Not reviewed this visit You Were Diagnosed With   
  
 Codes Comments Controlled type 2 diabetes mellitus without complication, with long-term current use of insulin (HCC)    -  Primary ICD-10-CM: E11.9, Z79.4 ICD-9-CM: 250.00, V58.67 Essential hypertension     ICD-10-CM: I10 
ICD-9-CM: 401.9 Seasonal allergic rhinitis due to pollen     ICD-10-CM: J30.1 ICD-9-CM: 477.0 Mild intermittent asthma without complication     [facility]: J45.20 ICD-9-CM: 493.90 Vitals BP Pulse Temp Resp Height(growth percentile) Weight(growth percentile) 100/60 (BP 1 Location: Right arm, BP Patient Position: Sitting) 87 97.8 °F (36.6 °C) (Oral) 20 5' 5\" (1.651 m) 162 lb (73.5 kg) LMP SpO2 BMI OB Status Smoking Status (LMP Unknown) 98% 26.96 kg/m2 Having regular periods Never Smoker BMI and BSA Data Body Mass Index Body Surface Area  
 26.96 kg/m 2 1.84 m 2 Preferred Pharmacy Pharmacy Name Phone [facility] [address] [phone] Your Updated Medication List  
  
   
This list is accurate as of 6/21/18  1:24 PM.  Always use your most recent med list.  
  
  
  
  
 albuterol 90 mcg/actuation inhaler Commonly known as:  PROVENTIL HFA, VENTOLIN HFA, PROAIR HFA Take 2 Puffs by inhalation every four (4) hours as needed for Wheezing. * Blood-Glucose Meter monitoring kit Commonly known as:  CONTOUR METER Use as directed * Blood-Glucose Meter monitoring kit Use daily as directed. E10.9  TYPE: one EPINEPHrine 0.3 mg/0.3 mL injection Commonly known as:  EPIPEN 2-SUMAYA  
0.3 mL by IntraMUSCular route once as needed for up to 1 dose. * glucose blood VI test strips strip Commonly known as:  Ascensia CONTOUR Test blood sugar 3 times daily * glucose blood VI test strips strip Commonly known as:  TRUETRACK TEST Check blood glucose 2-3 times a day HumuLIN N NPH U-100 Insulin 100 unit/mL injection Generic drug:  insulin NPH INJECT 30 UNITS UNDER SKIN IN AM AND 25 AT NIGHT HumuLIN R Regular U-100 Insuln 100 unit/mL injection Generic drug:  insulin regular INJECT 20 UNITS UNDER SKIN BEFORE BREAKFAST AND DINNER Insulin Needles (Disposable) 29 gauge x 1/2\" Ndle Use as directed Insulin Syringe-Needle U-100 0.5 mL 29 gauge x 1/2\" Syrg Commonly known as:  BD Insulin Syringe SafetyGlide Use with insulin Lancets Misc As directed  
  
 losartan 25 mg tablet Commonly known as:  COZAAR Take 1 Tab by mouth daily. montelukast 10 mg tablet Commonly known as:  SINGULAIR Take 1 Tab by mouth daily. * Notice: This list has 4 medication(s) that are the same as other medications prescribed for you. Read the directions carefully, and ask your doctor or other care provider to review them with you. Prescriptions Sent to Pharmacy Refills  
 albuterol (PROVENTIL HFA, VENTOLIN HFA, PROAIR HFA) 90 mcg/actuation inhaler 12 Sig: Take 2 Puffs by inhalation every four (4) hours as needed for Wheezing. Class: Normal  
 Pharmacy: [facility], [address] Ph #: [phone] Route: Inhalation We Performed the Following AMB POC GLUCOSE BLOOD, BY GLUCOSE MONITORING DEVICE [92057 CPT(R)] REFERRAL TO ENDOCRINOLOGY [VOL64 Custom] Follow-up Instructions Return in about 4 weeks (around 7/19/2018), or if symptoms worsen or fail to improve. Referral Information Referral ID Referred By Referred To  
  
 [id number] Tosha Rogel [address] [place] [address] Phone: [phone] Fax: [phone] Visits Status Start Date End Date 1 New Request 6/21/18 6/21/19 If your referral has a status of pending review or denied, additional information will be sent to support the outcome of this decision. Patient Instructions Mas Con Movil Activation Thank you for requesting access to Mas Con Movil. Please follow the instructions below to securely access and download your online medical record. Mas Con Movil allows you to send messages to your doctor, view your test results, renew your prescriptions, schedule appointments, and more. How Do I Sign Up? 1. In your internet browser, go to [email or web address] 
2. Click on the First Time User? Click Here link in the Sign In box. You will be redirect to the New Member Sign Up page. 3. Enter your Mas Con Movil Access Code exactly as it appears below.  You will not need to use this code after youve completed the sign-up process. If you do not sign up before the expiration date, you must request a new code. Jukedeck Access Code: [email or web address] Expires: 8/15/2018  2:22 PM (This is the date your Jukedeck access code will ) 4. Enter the last four digits of your Social Security Number (xxxx) and Date of Birth (mm/dd/yyyy) as indicated and click Submit. You will be taken to the next sign-up page. 5. Create a Jukedeck ID. This will be your Jukedeck login ID and cannot be changed, so think of one that is secure and easy to remember. 6. Create a Jukedeck password. You can change your password at any time. 7. Enter your Password Reset Question and Answer. This can be used at a later time if you forget your password. 8. Enter your e-mail address. You will receive e-mail notification when new information is available in [address]. 9. Click Sign Up. You can now view and download portions of your medical record. 10. Click the Download Summary menu link to download a portable copy of your medical information. Additional Information If you have questions, please visit the Frequently Asked Questions section of the Jukedeck website at [email or web address] Remember, Jukedeck is NOT to be used for urgent needs. For medical emergencies, dial 911. Introducing [facility]! Chuck Roland introduces Jukedeck patient portal. Now you can access parts of your medical record, email your doctor's office, and request medication refills online. 1. In your internet browser, go to [email or web address] 2. Click on the First Time User? Click Here link in the Sign In box. You will see the New Member Sign Up page. 3. Enter your Jukedeck Access Code exactly as it appears below. You will not need to use this code after youve completed the sign-up process.  If you do not sign up before the expiration date, you must request a new code. 
 
· SocialMedia.com Access Code: [email or web address] Expires: 8/15/2018  2:22 PM 
 
4. Enter the last four digits of your Social Security Number (xxxx) and Date of Birth (mm/dd/yyyy) as indicated and click Submit. You will be taken to the next sign-up page. 5. Create a SocialMedia.com ID. This will be your SocialMedia.com login ID and cannot be changed, so think of one that is secure and easy to remember. 6. Create a SocialMedia.com password. You can change your password at any time. 7. Enter your Password Reset Question and Answer. This can be used at a later time if you forget your password. 8. Enter your e-mail address. You will receive e-mail notification when new information is available in [address] 9. Click Sign Up. You can now view and download portions of your medical record. 10. Click the Download Summary menu link to download a portable copy of your medical information. If you have questions, please visit the Frequently Asked Questions section of the SocialMedia.com website. Remember, SocialMedia.com is NOT to be used for urgent needs. For medical emergencies, dial 911. Now available from your iPhone and Android! Please provide this summary of care documentation to your next provider. Your primary care clinician is listed as Vanita Russell. If you have any questions after today's visit, please call [phone].

## 2018-06-21 NOTE — PATIENT INSTRUCTIONS
Yoyo Activation    Thank you for requesting access to Yoyo. Please follow the instructions below to securely access and download your online medical record. Yoyo allows you to send messages to your doctor, view your test results, renew your prescriptions, schedule appointments, and more. How Do I Sign Up? 1. In your internet browser, go to [email or web address].[email or web address] by Glam Media  2. Click on the First Time User? Click Here link in the Sign In box. You will be redirect to the New Member Sign Up page. 3. Enter your Yoyo Access Code exactly as it appears below. You will not need to use this code after youve completed the sign-up process. If you do not sign up before the expiration date, you must request a new code. Yoyo Access Code: [email or web address]  Expires: 8/15/2018  2:22 PM (This is the date your Yoyo access code will )    4. Enter the last four digits of your Social Security Number (xxxx) and Date of Birth (mm/dd/yyyy) as indicated and click Submit. You will be taken to the next sign-up page. 5. Create a Yoyo ID. This will be your Yoyo login ID and cannot be changed, so think of one that is secure and easy to remember. 6. Create a Yoyo password. You can change your password at any time. 7. Enter your Password Reset Question and Answer. This can be used at a later time if you forget your password. 8. Enter your e-mail address. You will receive e-mail notification when new information is available in [address] 9. Click Sign Up. You can now view and download portions of your medical record. 10. Click the Download Summary menu link to download a portable copy of your medical information. Additional Information    If you have questions, please visit the Frequently Asked Questions section of the Yoyo website at [email or web address] Remember, Yoyo is NOT to be used for urgent needs. For medical emergencies, dial 911.

## 2018-06-21 NOTE — PROGRESS NOTES
Reyna Tavares is a 30 y.o. female and presents with Diabetes  . Subjective:    She had a recent allergy with hives reported,she states her hives have cleared  She has seen allergy doctor and was tested with nothing found thus far. Hypertension Review:  The patient has essential hypertension,states bp has been erratic  Diet and Lifestyle: generally follows a  low sodium diet, exercises sporadically  Home BP Monitoring: is not measured at home. Pertinent ROS: taking medications as instructed, no medication side effects noted, no TIA's, no chest pain on exertion, no dyspnea on exertion, no swelling of ankles. Asthma Review:  The patient is being seen for follow up of asthma,  currently stable. Asthma symptoms occur: infrequently. Wheezing when present is described as mild and easily relieved with rescue bronchodilator. The patient reports use of a steroid inhaler. Frequency of use of quick-relief meds: rarely. Regimen compliance: The patient reports adherence to this regimen. Diabetes Mellitus Review:  She has diabetes mellitus. Diabetic ROS - medication compliance: noncompliant all of the time, diabetic diet noncompliance: compliant all of the time, home glucose monitoring: is performed. Known diabetic complications: none  Cardiovascular risk factors: family history, dyslipidemia, diabetes mellitus, obesity, hypertension  Current diabetic medications include insulin   Eye exam current (within one year): no  Weight trend: stable  Prior visit with dietician: no  Current diet: \"healthy\" diet  in general  Current exercise: walking  Current monitoring regimen: home blood tests - daily  Home blood sugar records: trend: stable  Any episodes of hypoglycemia? no  Is She on ACE inhibitor or angiotensin II receptor blocker? Yes     Allergic Rhinitis  Patient presents for evaluation of allergic symptoms. Symptoms include nasal congestion, rhinorrhea, sneezing, eye itching, watery eyes. Precipitants haved included possible pollen. Review of Systems  Constitutional: negative for fevers, chills, anorexia and weight loss  Eyes:   negative for visual disturbance and irritation  ENT:   nasal congestion,ear pains. hoarseness  Respiratory:  dyspnea,wheezing  CV:   negative for chest pain, palpitations, lower extremity edema  GI:   negative for nausea, vomiting, diarrhea, abdominal pain,melena  Endo:               negative for polyuria,polydipsia,polyphagia,heat intolerance  Genitourinary: negative for frequency, dysuria and hematuria  Integument:  negative for rash and pruritus  Hematologic:  negative for easy bruising and gum/nose bleeding  Musculoskel: negative for myalgias, arthralgias, back pain, muscle weakness, joint pain  Neurological:  negative for headaches, dizziness, vertigo, memory problems and gait   Behavl/Psych: negative for feelings of anxiety, depression, mood changes    Past Medical History:   Diagnosis Date    Asthma     Diabetes ([facility] [address]     Type 1    Headache      Past Surgical History:   Procedure Laterality Date    HX  SECTION      HX GYN  2010    L ectopic pregnancy, L fallopian tube removed     Social History     Social History    Marital status: SINGLE     Spouse name: N/A    Number of children: N/A    Years of education: N/A     Social History Main Topics    Smoking status: Never Smoker    Smokeless tobacco: Never Used    Alcohol use No    Drug use: No    Sexual activity: Yes     Partners: Male     Birth control/ protection: None     Other Topics Concern    None     Social History Narrative     Family History   Problem Relation Age of Onset    Diabetes Mother     Hypertension Mother     Diabetes Father     Hypertension Father      Current Outpatient Prescriptions   Medication Sig Dispense Refill    albuterol (PROVENTIL HFA, VENTOLIN HFA, PROAIR HFA) 90 mcg/actuation inhaler Take 2 Puffs by inhalation every four (4) hours as needed for Wheezing. 1 Inhaler 12    Blood-Glucose Meter monitoring kit Use daily as directed. E10.9    TYPE: one 1 Kit 0    HUMULIN R REGULAR U-100 INSULN 100 unit/mL injection INJECT 20 UNITS UNDER SKIN BEFORE BREAKFAST AND DINNER 10 mL 12    HUMULIN N NPH U-100 INSULIN 100 unit/mL injection INJECT 30 UNITS UNDER SKIN IN AM AND 25 AT NIGHT 10 mL 12    glucose blood VI test strips (TRUETRACK TEST) strip Check blood glucose 2-3 times a day 100 Strip 3    Insulin Needles, Disposable, 29 gauge x 1/2\" ndle Use as directed 100 Pen Needle 12    EPINEPHrine (EPIPEN 2-SUMAYA) 0.3 mg/0.3 mL injection 0.3 mL by IntraMUSCular route once as needed for up to 1 dose. 2 Syringe 3    Insulin Syringe-Needle U-100 (BD INSULIN SYRINGE SAFETYGLIDE) 0.5 mL 29 gauge x 1/2\" syrg Use with insulin 120 Syringe 3    glucose blood VI test strips (ASCENSIA CONTOUR) strip Test blood sugar 3 times daily 100 Strip 12    Blood-Glucose Meter (CONTOUR METER) monitoring kit Use as directed 1 Kit 0    Lancets Misc As directed 1 Package 11    losartan (COZAAR) 25 mg tablet Take 1 Tab by mouth daily. 30 Tab 12    montelukast (SINGULAIR) 10 mg tablet Take 1 Tab by mouth daily.  30 Tab 12     Allergies   Allergen Reactions    Doxycycline Hives       Objective:  Visit Vitals    /60 (BP 1 Location: Right arm, BP Patient Position: Sitting)    Pulse 87    Temp 97.8 °F (36.6 °C) (Oral)    Resp 20    Ht 5' 5\" (1.651 m)    Wt 162 lb (73.5 kg)    LMP  (LMP Unknown)    SpO2 98%    BMI 26.96 kg/m2     Physical Exam:   General appearance - alert, well appearing, and in no distress  Mental status - alert, oriented to person, place, and time  EYE-HARRY, EOMI, corneas normal, no foreign bodies  ENT-ENT exam normal, no neck nodes or sinus tenderness  Nose - normal and patent, no erythema, discharge or polyps  Mouth - mucous membranes moist, pharynx normal without lesions  Neck - supple, no significant adenopathy   Chest - clear to auscultation, no wheezes, rales or rhonchi, symmetric air entry   Heart - normal rate, regular rhythm, normal S1, S2, no murmurs, rubs, clicks or gallops   Abdomen - soft, nontender, nondistended, no masses or organomegaly  Lymph- no adenopathy palpable  Ext-peripheral pulses normal, no pedal edema, no clubbing or cyanosis  Skin-Warm and dry. no hyperpigmentation, vitiligo, or suspicious lesions  Neuro -alert, oriented, normal speech, no focal findings or movement disorder noted  Neck-normal C-spine, no tenderness, full ROM without pain      Results for orders placed or performed in visit on 06/21/18   AMB POC GLUCOSE BLOOD, BY GLUCOSE MONITORING DEVICE   Result Value Ref Range    Glucose  mg/dL       Assessment/Plan:    ICD-10-CM ICD-9-CM    1. Controlled type 2 diabetes mellitus without complication, with long-term current use of insulin ([facility]) E11.9 250.00 AMB POC GLUCOSE BLOOD, BY GLUCOSE MONITORING DEVICE    Z79.4 V58.67 REFERRAL TO ENDOCRINOLOGY   2. Essential hypertension I10 401.9    3. Seasonal allergic rhinitis due to pollen J30.1 477.0    4. Mild intermittent asthma without complication F35.11 029.79 albuterol (PROVENTIL HFA, VENTOLIN HFA, PROAIR HFA) 90 mcg/actuation inhaler     Orders Placed This Encounter   Dino Byrd Endocrinology ref [facility]     Referral Priority:   Routine     Referral Type:   Consultation     Referral Reason:   Specialty Services Required     Referred to Provider:   Miranda Delarosa MD    AMB POC GLUCOSE BLOOD, BY GLUCOSE MONITORING DEVICE    albuterol (PROVENTIL HFA, VENTOLIN HFA, PROAIR HFA) 90 mcg/actuation inhaler     Sig: Take 2 Puffs by inhalation every four (4) hours as needed for Wheezing. Dispense:  1 Inhaler     Refill:  12     routine labs ordered,Take 81mg aspirin daily  She needs an insulin pump trial  Patient Instructions   Cartela AB Activation    Thank you for requesting access to Cartela AB.  Please follow the instructions below to securely access and download your online medical record. Ideagen allows you to send messages to your doctor, view your test results, renew your prescriptions, schedule appointments, and more. How Do I Sign Up? 1. In your internet browser, go to [email or web address].Binary Event Network  2. Click on the First Time User? Click Here link in the Sign In box. You will be redirect to the New Member Sign Up page. 3. Enter your Ideagen Access Code exactly as it appears below. You will not need to use this code after youve completed the sign-up process. If you do not sign up before the expiration date, you must request a new code. Ideagen Access Code: [email or web address]  Expires: 8/15/2018  2:22 PM (This is the date your Ideagen access code will )    4. Enter the last four digits of your Social Security Number (xxxx) and Date of Birth (mm/dd/yyyy) as indicated and click Submit. You will be taken to the next sign-up page. 5. Create a Ideagen ID. This will be your Ideagen login ID and cannot be changed, so think of one that is secure and easy to remember. 6. Create a Ideagen password. You can change your password at any time. 7. Enter your Password Reset Question and Answer. This can be used at a later time if you forget your password. 8. Enter your e-mail address. You will receive e-mail notification when new information is available in [address] 9. Click Sign Up. You can now view and download portions of your medical record. 10. Click the Download Summary menu link to download a portable copy of your medical information. Additional Information    If you have questions, please visit the Frequently Asked Questions section of the Ideagen website at [email or web address] Remember, Ideagen is NOT to be used for urgent needs. For medical emergencies, dial 911. Follow-up Disposition:  Return in about 4 weeks (around 2018), or if symptoms worsen or fail to improve.       I have reviewed with the patient details of the assessment and plan and all questions were answered. Relevent patient education was performed    An After Visit Summary was printed and given to the patient.

## 2018-06-25 RX ORDER — NORGESTIMATE AND ETHINYL ESTRADIOL 0.25-0.035
KIT ORAL
Refills: 1 | OUTPATIENT
Start: 2018-06-25

## 2018-08-01 ENCOUNTER — APPOINTMENT (OUTPATIENT)
Dept: GENERAL RADIOLOGY | Age: 38
End: 2018-08-01
Attending: PHYSICIAN ASSISTANT
Payer: MEDICAID

## 2018-08-01 ENCOUNTER — HOSPITAL ENCOUNTER (EMERGENCY)
Age: 38
Discharge: HOME | End: 2018-08-01
Attending: EMERGENCY MEDICINE | Admitting: EMERGENCY MEDICINE
Payer: MEDICAID

## 2018-08-01 VITALS
HEART RATE: 80 BPM | DIASTOLIC BLOOD PRESSURE: 85 MMHG | SYSTOLIC BLOOD PRESSURE: 126 MMHG | OXYGEN SATURATION: 100 % | RESPIRATION RATE: 18 BRPM | TEMPERATURE: 98.6 F | BODY MASS INDEX: 24.33 KG/M2 | WEIGHT: 155 LBS | HEIGHT: 67 IN

## 2018-08-01 DIAGNOSIS — G44.319 ACUTE POST-TRAUMATIC HEADACHE, NOT INTRACTABLE: ICD-10-CM

## 2018-08-01 DIAGNOSIS — S16.1XXA STRAIN OF NECK MUSCLE, INITIAL ENCOUNTER: Primary | ICD-10-CM
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
## 2018-08-01 PROCEDURE — 99283 EMERGENCY DEPT VISIT LOW MDM: CPT

## 2018-08-01 PROCEDURE — 74011250637 HC RX REV CODE- 250/637: Performed by: PHYSICIAN ASSISTANT

## 2018-08-01 PROCEDURE — 72050 X-RAY EXAM NECK SPINE 4/5VWS: CPT

## 2018-08-01 RX ORDER — CYCLOBENZAPRINE HCL 10 MG
10 TABLET ORAL
Qty: 15 TAB | Refills: 0 | Status: SHIPPED | OUTPATIENT
Start: 2018-08-01 | End: 2021-06-09

## 2018-08-01 RX ORDER — BUTALBITAL, ACETAMINOPHEN AND CAFFEINE 50; 325; 40 MG/1; MG/1; MG/1
1 TABLET ORAL
Status: COMPLETED | OUTPATIENT
Start: 2018-08-01 | End: 2018-08-01

## 2018-08-01 RX ORDER — BUTALBITAL, ACETAMINOPHEN AND CAFFEINE 50; 325; 40 MG/1; MG/1; MG/1
1 TABLET ORAL
Qty: 12 TAB | Refills: 0 | Status: SHIPPED | OUTPATIENT
Start: 2018-08-01 | End: 2021-06-09

## 2018-08-01 RX ORDER — NAPROXEN 500 MG/1
500 TABLET ORAL
Qty: 20 TAB | Refills: 0 | Status: SHIPPED | OUTPATIENT
Start: 2018-08-01 | End: 2018-08-11

## 2018-08-01 RX ADMIN — BUTALBITAL, ACETAMINOPHEN AND CAFFEINE 1 TABLET: 50; 325; 40 TABLET ORAL at 20:51

## 2018-08-01 NOTE — LETTER
[facility] EMERGENCY DEPT 
[address] [facility] [address] [id number] [phone] Work/School Note Date: 8/1/2018 To Whom It May concern: 
 
Christina Wheat was seen and treated today in the emergency room by the following provider(s): 
Attending Provider: Maritza Can MD 
Physician Assistant: Bella Llanes PA-C. Christina Wheat may return to work on 8/3/18. Sincerely, Bella Llanes PA-C
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
## 2018-08-02 NOTE — ED NOTES
Patient (s) has been given copy of dc instructions and zero paper script(s) and three electronic scripts. Patient (s) has verbalized understanding of instructions and script (s). Patient given a current medication reconciliation form and verbalized understanding of their medications. Patient (s)has verbalized understanding of the importance of discussing medications with  his or her physician or clinic they will be following up with. Patient alert and oriented and in no acute distress. Patient offered wheelchair from treatment area to hospital entrance, patient declined wheelchair.
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
## 2018-08-02 NOTE — ED NOTES
(late entry) pt called on 8/2/18 and requested additional pain medication to be sent to her pharmacy. Pt educated that additional prescriptions can't be written without patient being seen again. Medication teaching on 3 prescriptions that were given last night was completed and pt verbalized understanding. Pt encouraged to f/u with PCP or ED if s/si persists.

## 2018-08-08 ENCOUNTER — HOSPITAL ENCOUNTER (EMERGENCY)
Age: 38
Discharge: HOME | End: 2018-08-08
Attending: EMERGENCY MEDICINE
Payer: MEDICAID

## 2018-08-08 ENCOUNTER — APPOINTMENT (OUTPATIENT)
Dept: CT IMAGING | Age: 38
End: 2018-08-08
Attending: PHYSICIAN ASSISTANT
Payer: MEDICAID

## 2018-08-08 VITALS
RESPIRATION RATE: 16 BRPM | BODY MASS INDEX: 24.33 KG/M2 | HEIGHT: 67 IN | DIASTOLIC BLOOD PRESSURE: 74 MMHG | HEART RATE: 90 BPM | WEIGHT: 155 LBS | OXYGEN SATURATION: 100 % | SYSTOLIC BLOOD PRESSURE: 130 MMHG | TEMPERATURE: 97.9 F

## 2018-08-08 DIAGNOSIS — R51.9 NONINTRACTABLE HEADACHE, UNSPECIFIED CHRONICITY PATTERN, UNSPECIFIED HEADACHE TYPE: ICD-10-CM

## 2018-08-08 DIAGNOSIS — E16.2 HYPOGLYCEMIA: Primary | ICD-10-CM

## 2018-08-08 LAB
ANION GAP SERPL CALC-SCNC: 7 MMOL/L (ref 5–15)
BASOPHILS # BLD: 0.1 K/UL (ref 0–0.1)
BASOPHILS NFR BLD: 1 % (ref 0–1)
BUN SERPL-MCNC: 13 MG/DL (ref 6–20)
BUN/CREAT SERPL: 21 (ref 12–20)
CALCIUM SERPL-MCNC: 8.9 MG/DL (ref 8.5–10.1)
CHLORIDE SERPL-SCNC: 106 MMOL/L (ref 97–108)
CO2 SERPL-SCNC: 28 MMOL/L (ref 21–32)
CREAT SERPL-MCNC: 0.63 MG/DL (ref 0.55–1.02)
DIFFERENTIAL METHOD BLD: NORMAL
EOSINOPHIL # BLD: 0.1 K/UL (ref 0–0.4)
EOSINOPHIL NFR BLD: 1 % (ref 0–7)
ERYTHROCYTE [DISTWIDTH] IN BLOOD BY AUTOMATED COUNT: 13.2 % (ref 11.5–14.5)
GLUCOSE BLD STRIP.AUTO-MCNC: 148 MG/DL (ref 65–100)
GLUCOSE BLD STRIP.AUTO-MCNC: 55 MG/DL (ref 65–100)
GLUCOSE BLD STRIP.AUTO-MCNC: 57 MG/DL (ref 65–100)
GLUCOSE BLD STRIP.AUTO-MCNC: 76 MG/DL (ref 65–100)
GLUCOSE BLD STRIP.AUTO-MCNC: 84 MG/DL (ref 65–100)
GLUCOSE SERPL-MCNC: 74 MG/DL (ref 65–100)
HCG UR QL: NEGATIVE
HCT VFR BLD AUTO: 38.5 % (ref 35–47)
HGB BLD-MCNC: 13 G/DL (ref 11.5–16)
IMM GRANULOCYTES # BLD: 0 K/UL (ref 0–0.04)
IMM GRANULOCYTES NFR BLD AUTO: 0 % (ref 0–0.5)
INR PPP: <0.9 (ref 0.9–1.1)
LYMPHOCYTES # BLD: 2.5 K/UL (ref 0.8–3.5)
LYMPHOCYTES NFR BLD: 32 % (ref 12–49)
MCH RBC QN AUTO: 30.4 PG (ref 26–34)
MCHC RBC AUTO-ENTMCNC: 33.8 G/DL (ref 30–36.5)
MCV RBC AUTO: 90.2 FL (ref 80–99)
MONOCYTES # BLD: 0.6 K/UL (ref 0–1)
MONOCYTES NFR BLD: 7 % (ref 5–13)
NEUTS SEG # BLD: 4.7 K/UL (ref 1.8–8)
NEUTS SEG NFR BLD: 59 % (ref 32–75)
NRBC # BLD: 0 K/UL (ref 0–0.01)
NRBC BLD-RTO: 0 PER 100 WBC
PLATELET # BLD AUTO: 340 K/UL (ref 150–400)
PMV BLD AUTO: 10.5 FL (ref 8.9–12.9)
POTASSIUM SERPL-SCNC: 3.9 MMOL/L (ref 3.5–5.1)
PROTHROMBIN TIME: <9 SEC (ref 9–11.1)
RBC # BLD AUTO: 4.27 M/UL (ref 3.8–5.2)
SERVICE CMNT-IMP: ABNORMAL
SERVICE CMNT-IMP: NORMAL
SERVICE CMNT-IMP: NORMAL
SODIUM SERPL-SCNC: 141 MMOL/L (ref 136–145)
WBC # BLD AUTO: 7.9 K/UL (ref 3.6–11)

## 2018-08-08 PROCEDURE — 36415 COLL VENOUS BLD VENIPUNCTURE: CPT | Performed by: PHYSICIAN ASSISTANT

## 2018-08-08 PROCEDURE — 82962 GLUCOSE BLOOD TEST: CPT

## 2018-08-08 PROCEDURE — 85610 PROTHROMBIN TIME: CPT | Performed by: PHYSICIAN ASSISTANT

## 2018-08-08 PROCEDURE — 70450 CT HEAD/BRAIN W/O DYE: CPT

## 2018-08-08 PROCEDURE — 74011250637 HC RX REV CODE- 250/637: Performed by: PHYSICIAN ASSISTANT

## 2018-08-08 PROCEDURE — 99284 EMERGENCY DEPT VISIT MOD MDM: CPT

## 2018-08-08 PROCEDURE — 85025 COMPLETE CBC W/AUTO DIFF WBC: CPT | Performed by: PHYSICIAN ASSISTANT

## 2018-08-08 PROCEDURE — 81025 URINE PREGNANCY TEST: CPT

## 2018-08-08 PROCEDURE — 80048 BASIC METABOLIC PNL TOTAL CA: CPT | Performed by: PHYSICIAN ASSISTANT

## 2018-08-08 RX ORDER — NAPROXEN 250 MG/1
500 TABLET ORAL
Status: COMPLETED | OUTPATIENT
Start: 2018-08-08 | End: 2018-08-08

## 2018-08-08 RX ADMIN — NAPROXEN 500 MG: 250 TABLET ORAL at 16:01
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
## 2018-08-08 NOTE — LETTER
[facility] EMERGENCY DEPT 
[address] [place] [address] [address] 
[phone] Work/School Note Date: 8/8/2018 To Whom It May concern: 
 
Minerva Graham was seen and treated today in the emergency room by the following provider(s): 
Attending Provider: Jaiden Pham MD 
Physician Assistant: Jt Gomez. Minerva Graham may return to work on 8/10/18 or earlier if feeling better.  
 
Sincerely, 
 
 
 
 
Kenney Negrete RN

## 2018-08-08 NOTE — DISCHARGE INSTRUCTIONS
Headache: Care Instructions  Your Care Instructions    Headaches have many possible causes. Most headaches aren't a sign of a more serious problem, and they will get better on their own. Home treatment may help you feel better faster. The doctor has checked you carefully, but problems can develop later. If you notice any problems or new symptoms, get medical treatment right away. Follow-up care is a key part of your treatment and safety. Be sure to make and go to all appointments, and call your doctor if you are having problems. It's also a good idea to know your test results and keep a list of the medicines you take. How can you care for yourself at home? · Do not drive if you have taken a prescription pain medicine. · Rest in a quiet, dark room until your headache is gone. Close your eyes and try to relax or go to sleep. Don't watch TV or read. · Put a cold, moist cloth or cold pack on the painful area for 10 to 20 minutes at a time. Put a thin cloth between the cold pack and your skin. · Use a warm, moist towel or a heating pad set on low to relax tight shoulder and neck muscles. · Have someone gently massage your neck and shoulders. · Take pain medicines exactly as directed. ¨ If the doctor gave you a prescription medicine for pain, take it as prescribed. ¨ If you are not taking a prescription pain medicine, ask your doctor if you can take an over-the-counter medicine. · Be careful not to take pain medicine more often than the instructions allow, because you may get worse or more frequent headaches when the medicine wears off. · Do not ignore new symptoms that occur with a headache, such as a fever, weakness or numbness, vision changes, or confusion. These may be signs of a more serious problem. To prevent headaches  · Keep a headache diary so you can figure out what triggers your headaches. Avoiding triggers may help you prevent headaches.  Record when each headache began, how long it lasted, and what the pain was like (throbbing, aching, stabbing, or dull). Write down any other symptoms you had with the headache, such as nausea, flashing lights or dark spots, or sensitivity to bright light or loud noise. Note if the headache occurred near your period. List anything that might have triggered the headache, such as certain foods (chocolate, cheese, wine) or odors, smoke, bright light, stress, or lack of sleep. · Find healthy ways to deal with stress. Headaches are most common during or right after stressful times. Take time to relax before and after you do something that has caused a headache in the past.  · Try to keep your muscles relaxed by keeping good posture. Check your jaw, face, neck, and shoulder muscles for tension, and try relaxing them. When sitting at a desk, change positions often, and stretch for 30 seconds each hour. · Get plenty of sleep and exercise. · Eat regularly and well. Long periods without food can trigger a headache. · Treat yourself to a massage. Some people find that regular massages are very helpful in relieving tension. · Limit caffeine by not drinking too much coffee, tea, or soda. But don't quit caffeine suddenly, because that can also give you headaches. · Reduce eyestrain from computers by blinking frequently and looking away from the computer screen every so often. Make sure you have proper eyewear and that your monitor is set up properly, about an arm's length away. · Seek help if you have depression or anxiety. Your headaches may be linked to these conditions. Treatment can both prevent headaches and help with symptoms of anxiety or depression. When should you call for help? Call 911 anytime you think you may need emergency care. For example, call if:    · You have signs of a stroke. These may include:  ¨ Sudden numbness, paralysis, or weakness in your face, arm, or leg, especially on only one side of your body. ¨ Sudden vision changes.   ¨ Sudden trouble speaking. ¨ Sudden confusion or trouble understanding simple statements. ¨ Sudden problems with walking or balance. ¨ A sudden, severe headache that is different from past headaches.    Call your doctor now or seek immediate medical care if:    · You have a new or worse headache.     · Your headache gets much worse. Where can you learn more? Go to http://chitra-gregg.info/. Enter M271 in the search box to learn more about \"Headache: Care Instructions. \"  Current as of: October 9, 2017  Content Version: 11.7  © 9698-0610 [employer]. Care instructions adapted under license by [email or web address] (which disclaims liability or warranty for this information). If you have questions about a medical condition or this instruction, always ask your healthcare professional. [address] any warranty or liability for your use of this information. Hypoglycemia: Care Instructions  Your Care Instructions    Hypoglycemia means that your blood sugar is low and your body is not getting enough fuel. Some people get low blood sugar from not eating often enough. Some medicines to treat diabetes can cause low blood sugar. People who have had surgery on their stomachs or intestines may get hypoglycemia. Problems with the pancreas, kidneys, or liver also can cause low blood sugar. A snack or drink with sugar in it will raise your blood sugar and should ease your symptoms right away. Your doctor may recommend that you change or stop your medicines until you can get your blood sugar levels under control. In the long run, you may need to change your diet and eating habits so that you get enough fuel for your body throughout the day. Follow-up care is a key part of your treatment and safety. Be sure to make and go to all appointments, and call your doctor if you are having problems.  It's also a good idea to know your test results and keep a list of the medicines you take.  How can you care for yourself at home? · Learn to recognize the early signs of low blood sugar. Signs include:  ¨ Nausea. ¨ Hunger. ¨ Feeling nervous, irritable, or shaky. ¨ Cold, clammy, wet skin. ¨ Sweating (when you are not exercising). ¨ A fast heartbeat. ¨ Numbness or tingling of the fingertips or lips. · If you feel an episode of low blood sugar coming on, drink fruit juice or sugared (not diet) soda, or eat sugar in the form of candy, cubes, or tablets. [employer] are another [employer]. · Eat small, frequent meals so that you do not get too hungry between meals. · Balance extra exercise with eating more. · Keep a written record of your low blood sugar episodes, including when you last ate and what you ate, so that you can learn what causes your blood sugar to drop. · Make sure your family, friends, and coworkers know the symptoms of low blood sugar and know what to do to get your sugar level up. · Wear medical alert jewelry that lists your condition. You can buy this at most [employer]. When should you call for help? Call 911 anytime you think you may need emergency care. For example, call if:    · You passed out (lost consciousness).     · You are confused or cannot think clearly.     · Your blood sugar is very high or very low.    Watch closely for changes in your health, and be sure to contact your doctor if:    · Your blood sugar stays outside the level your doctor set for you.     · You have any problems. Where can you learn more? Go to http://chitra-gregg.info/. Enter R465 in the search box to learn more about \"Hypoglycemia: Care Instructions. \"  Current as of: December 7, 2017  Content Version: 11.7  © 8653-5658 [employer]. Care instructions adapted under license by [employer] (which disclaims liability or warranty for this information).  If you have questions about a medical condition or this instruction, always ask your healthcare professional. [place] [address] any warranty or liability for your use of this information. Postconcussion Syndrome: Care Instructions  Your Care Instructions    Postconcussion syndrome occurs after a blow to the head or body. Common symptoms are changes in the ability to concentrate, think, remember, or solve problems. Symptoms, which may include headaches, personality changes, and dizziness, may be related to stress from the events surrounding the accident that caused the injury. Follow-up care is a key part of your treatment and safety. Be sure to make and go to all appointments, and call your doctor if you are having problems. It's also a good idea to know your test results and keep a list of the medicines you take. How can you care for yourself at home? Pain  · Rest is the best treatment for postconcussion syndrome. · Do not drive if you have taken a prescription pain medicine. · Rest in a quiet, dark room until your headache is gone. Close your eyes and try to relax or go to sleep. Do not watch TV or read. · Put a cold, moist cloth or cold pack on the painful area for 10 to 20 minutes at a time. Put a thin cloth between the cold pack and your skin. · Have someone gently massage your neck and shoulders. · Take your medicines exactly as prescribed. Call your doctor if you think you are having a problem with your medicine. You will get more details on the specific medicines your doctor prescribes. Stress  · Try to reduce stress. Some ways to do this include:  ¨ Taking slow, deep breaths. ¨ Soaking in a warm bath. ¨ Listening to soothing music. ¨ Taking a yoga class. ¨ Having a massage or back rub. ¨ Drinking a warm, nonalcoholic, noncaffeinated beverage. · Get enough sleep. · Eat a healthy, balanced diet. A balanced diet includes whole grains, dairy, fruits and vegetables, and protein.  Eat a variety of foods from each of those groups so you get all the nutrients you need. · Avoid alcohol and illegal drugs. · Try relaxation exercises, such as breathing and muscle relaxation exercises. · Talk to your doctor about counseling. It may help you deal with stress from your accident. When should you call for help? Watch closely for changes in your health, and be sure to contact your doctor if:    · You do not get better as expected.     · Your symptoms, such as headaches, trouble concentrating, or changes in mood, get worse. Where can you learn more? Go to http://chitra-gregg.info/. Enter D286 in the search box to learn more about \"Postconcussion Syndrome: Care Instructions. \"  Current as of: September 10, 2017  Content Version: 11.7  © 1178-7378 [employer]. Care instructions adapted under license by [employer] (which disclaims liability or warranty for this information). If you have questions about a medical condition or this instruction, always ask your healthcare professional. [address] any warranty or liability for your use of this information.

## 2018-08-08 NOTE — ED NOTES
Alli Nahs, was notified that pt's glucose was low. Lunch tray ordered. Pt given snacks/drank more orange juice. No si/s of acute distress or changes in mental status.

## 2018-08-08 NOTE — ED NOTES

## 2018-08-08 NOTE — ED NOTES
Pt tolerated orange juice without complaint. Updated on plan of care. No si/s of acute distress. Call bell within reach.

## 2018-08-08 NOTE — ED NOTES
Pt discharged by provider without s/si of acute distress, steady gait. Pt then given work excuse note, ok by provider. Left with steady gait, nonverbal pain of 0/10.

## 2018-10-29 ENCOUNTER — OFFICE VISIT (OUTPATIENT)
Dept: INTERNAL MEDICINE CLINIC | Age: 38
End: 2018-10-29

## 2018-10-29 VITALS
HEIGHT: 67 IN | SYSTOLIC BLOOD PRESSURE: 104 MMHG | DIASTOLIC BLOOD PRESSURE: 68 MMHG | OXYGEN SATURATION: 98 % | BODY MASS INDEX: 25.58 KG/M2 | HEART RATE: 93 BPM | TEMPERATURE: 98 F | RESPIRATION RATE: 16 BRPM | WEIGHT: 163 LBS

## 2018-10-29 DIAGNOSIS — E11.9 CONTROLLED TYPE 2 DIABETES MELLITUS WITHOUT COMPLICATION, WITH LONG-TERM CURRENT USE OF INSULIN (HCC): ICD-10-CM

## 2018-10-29 DIAGNOSIS — Z79.4 CONTROLLED TYPE 2 DIABETES MELLITUS WITHOUT COMPLICATION, WITH LONG-TERM CURRENT USE OF INSULIN (HCC): ICD-10-CM

## 2018-10-29 DIAGNOSIS — Z11.1 SCREENING FOR TUBERCULOSIS: Primary | ICD-10-CM

## 2018-10-29 DIAGNOSIS — I10 ESSENTIAL HYPERTENSION: ICD-10-CM

## 2018-10-29 DIAGNOSIS — E10.65 HYPERGLYCEMIA DUE TO TYPE 1 DIABETES MELLITUS (HCC): ICD-10-CM

## 2018-10-29 LAB
CHOLEST SERPL-MCNC: 229 MG/DL
GLUCOSE POC: 300 MG/DL
HBA1C MFR BLD HPLC: 10.4 %
HDLC SERPL-MCNC: 88 MG/DL
LDL CHOLESTEROL POC: 128 MG/DL
MM INDURATION POC: 0 MM (ref 0–5)
NON-HDL CHOLESTEROL, 011976: 141
PPD POC: 0 NEGATIVE
TCHOL/HDL RATIO (POC): 2.6
TRIGL SERPL-MCNC: 65 MG/DL

## 2018-10-29 NOTE — PROGRESS NOTES
1. Have you been to the ER, urgent care clinic since your last visit? Hospitalized since your last visit?no    2. Have you seen or consulted any other health care providers outside of the [address] [place] [address] since your last visit? Include any pap smears or colon screening.  No    PHQ over the last two weeks 5/24/2018   PHQ Not Done -   Little interest or pleasure in doing things Nearly every day   Feeling down, depressed, irritable, or hopeless Nearly every day   Total Score PHQ 2 6     Chief Complaint   Patient presents with    Complete Physical     to be a     PPD Placement

## 2018-10-29 NOTE — PATIENT INSTRUCTIONS
[facility] Activation    Thank you for requesting access to [facility]. Please follow the instructions below to securely access and download your online medical record. [facility] allows you to send messages to your doctor, view your test results, renew your prescriptions, schedule appointments, and more. How Do I Sign Up? 1. In your internet browser, go to [email or web address]  2. Click on the First Time User? Click Here link in the Sign In box. You will be redirect to the New Member Sign Up page. 3. Enter your [facility] Access Code exactly as it appears below. You will not need to use this code after youve completed the sign-up process. If you do not sign up before the expiration date, you must request a new code. [facility] Access Code: [email or web address]  Expires: 2019  2:27 PM (This is the date your [facility] access code will )    4. Enter the last four digits of your Social Security Number (xxxx) and Date of Birth (mm/dd/yyyy) as indicated and click Submit. You will be taken to the next sign-up page. 5. Create a [facility] ID. This will be your [facility] login ID and cannot be changed, so think of one that is secure and easy to remember. 6. Create a [facility] password. You can change your password at any time. 7. Enter your Password Reset Question and Answer. This can be used at a later time if you forget your password. 8. Enter your e-mail address. You will receive e-mail notification when new information is available in [address]. 9. Click Sign Up. You can now view and download portions of your medical record. 10. Click the Download Summary menu link to download a portable copy of your medical information. Additional Information    If you have questions, please visit the Frequently Asked Questions section of the [employer] website at [email or web address] Remember, [facility] is NOT to be used for urgent needs. For medical emergencies, dial 911.

## 2018-10-29 NOTE — PROGRESS NOTES
Reinaldo Ovalle is a 41 y.o. female and presents with Complete Physical (to be a ) and PPD Placement  . Subjective:    She needs a tb skin test      Hypertension Review:  The patient has essential hypertension,states bp has been erratic  Diet and Lifestyle: generally follows a  low sodium diet, exercises sporadically  Home BP Monitoring: is not measured at home. Pertinent ROS: taking medications as instructed, no medication side effects noted, no TIA's, no chest pain on exertion, no dyspnea on exertion, no swelling of ankles. Asthma Review:  The patient is being seen for follow up of asthma,  currently stable. Asthma symptoms occur: infrequently. Wheezing when present is described as mild and easily relieved with rescue bronchodilator. The patient reports use of a steroid inhaler. Frequency of use of quick-relief meds: rarely. Regimen compliance: The patient reports adherence to this regimen. Diabetes Mellitus Review:  She has diabetes mellitus. Diabetic ROS - medication compliance: noncompliant all of the time, diabetic diet noncompliance: compliant all of the time, home glucose monitoring: is performed. Known diabetic complications: none  Cardiovascular risk factors: family history, dyslipidemia, diabetes mellitus, obesity, hypertension  Current diabetic medications include insulin   Eye exam current (within one year): no  Weight trend: stable  Prior visit with dietician: no  Current diet: \"healthy\" diet  in general  Current exercise: walking  Current monitoring regimen: home blood tests - daily  Home blood sugar records: trend: stable  Any episodes of hypoglycemia? no  Is She on ACE inhibitor or angiotensin II receptor blocker? Yes     Allergic Rhinitis  Patient presents for evaluation of allergic symptoms. Symptoms include nasal congestion, rhinorrhea, sneezing, eye itching, watery eyes. Precipitants haved included possible pollen.         Review of Systems  Constitutional: negative for fevers, chills, anorexia and weight loss  Eyes:   negative for visual disturbance and irritation  ENT:   nasal congestion,ear pains. hoarseness  Respiratory:  dyspnea,wheezing  CV:   negative for chest pain, palpitations, lower extremity edema  GI:   negative for nausea, vomiting, diarrhea, abdominal pain,melena  Endo:               negative for polyuria,polydipsia,polyphagia,heat intolerance  Genitourinary: negative for frequency, dysuria and hematuria  Integument:  negative for rash and pruritus  Hematologic:  negative for easy bruising and gum/nose bleeding  Musculoskel: negative for myalgias, arthralgias, back pain, muscle weakness, joint pain  Neurological:  negative for headaches, dizziness, vertigo, memory problems and gait   Behavl/Psych: negative for feelings of anxiety, depression, mood changes    Past Medical History:   Diagnosis Date    Asthma     Diabetes ([address]     Type 1    Headache      Past Surgical History:   Procedure Laterality Date    HX  SECTION  2007    HX GYN  2010    L ectopic pregnancy, L fallopian tube removed     Social History     Socioeconomic History    Marital status: SINGLE     Spouse name: Not on file    Number of children: Not on file    Years of education: Not on file    Highest education level: Not on file   Social Needs    Financial resource strain: Not on file    Food insecurity - worry: Not on file    Food insecurity - inability: Not on file   Volt needs - medical: Not on file   Volt needs - non-medical: Not on file   Occupational History    Not on file   Tobacco Use    Smoking status: Never Smoker    Smokeless tobacco: Never Used   Substance and Sexual Activity    Alcohol use: No    Drug use: No    Sexual activity: Yes     Partners: Male     Birth control/protection: None   Other Topics Concern    Not on file   Social History Narrative    Not on file     Family History   Problem Relation Age of Onset    Diabetes Mother     Hypertension Mother     Diabetes Father     Hypertension Father      Current Outpatient Medications   Medication Sig Dispense Refill    albuterol (PROVENTIL HFA, VENTOLIN HFA, PROAIR HFA) 90 mcg/actuation inhaler Take 2 Puffs by inhalation every four (4) hours as needed for Wheezing. 1 Inhaler 12    Blood-Glucose Meter monitoring kit Use daily as directed. E10.9    TYPE: one 1 Kit 0    HUMULIN R REGULAR U-100 INSULN 100 unit/mL injection INJECT 20 UNITS UNDER SKIN BEFORE BREAKFAST AND DINNER 10 mL 12    HUMULIN N NPH U-100 INSULIN 100 unit/mL injection INJECT 30 UNITS UNDER SKIN IN AM AND 25 AT NIGHT 10 mL 12    glucose blood VI test strips (TRUETRACK TEST) strip Check blood glucose 2-3 times a day 100 Strip 3    Insulin Needles, Disposable, 29 gauge x 1/2\" ndle Use as directed 100 Pen Needle 12    EPINEPHrine (EPIPEN 2-SUMAYA) 0.3 mg/0.3 mL injection 0.3 mL by IntraMUSCular route once as needed for up to 1 dose. 2 Syringe 3    Insulin Syringe-Needle U-100 (BD INSULIN SYRINGE SAFETYGLIDE) 0.5 mL 29 gauge x 1/2\" syrg Use with insulin 120 Syringe 3    glucose blood VI test strips (ASCENSIA CONTOUR) strip Test blood sugar 3 times daily 100 Strip 12    Blood-Glucose Meter (CONTOUR METER) monitoring kit Use as directed 1 Kit 0    Lancets Misc As directed 1 Package 11    butalbital-acetaminophen-caffeine (FIORICET, ESGIC) -40 mg per tablet Take 1 Tab by mouth every four (4) hours as needed for Headache. (Patient not taking: Reported on 10/29/2018) 12 Tab 0    cyclobenzaprine (FLEXERIL) 10 mg tablet Take 1 Tab by mouth three (3) times daily as needed for Muscle Spasm(s). (Patient not taking: Reported on 10/29/2018) 15 Tab 0    losartan (COZAAR) 25 mg tablet Take 1 Tab by mouth daily. (Patient not taking: Reported on 10/29/2018) 30 Tab 12    montelukast (SINGULAIR) 10 mg tablet Take 1 Tab by mouth daily.  (Patient not taking: Reported on 10/29/2018) 30 Tab 12     Allergies   Allergen Reactions    Doxycycline Hives       Objective:  Visit Vitals  /68   Pulse 93   Temp 98 °F (36.7 °C) (Oral)   Resp 16   Ht 5' 7\" (1.702 m)   Wt 163 lb (73.9 kg)   SpO2 98%   BMI 25.53 kg/m²     Physical Exam:   General appearance - alert, well appearing, and in no distress  Mental status - alert, oriented to person, place, and time  EYE-HARRY, EOMI, corneas normal, no foreign bodies  ENT-ENT exam normal, no neck nodes or sinus tenderness  Nose - normal and patent, no erythema, discharge or polyps  Mouth - mucous membranes moist, pharynx normal without lesions  Neck - supple, no significant adenopathy   Chest - clear to auscultation, no wheezes, rales or rhonchi, symmetric air entry   Heart - normal rate, regular rhythm, normal S1, S2, no murmurs, rubs, clicks or gallops   Abdomen - soft, nontender, nondistended, no masses or organomegaly  Lymph- no adenopathy palpable  Ext-peripheral pulses normal, no pedal edema, no clubbing or cyanosis  Skin-Warm and dry. no hyperpigmentation, vitiligo, or suspicious lesions  Neuro -alert, oriented, normal speech, no focal findings or movement disorder noted  Neck-normal C-spine, no tenderness, full ROM without pain      Results for orders placed or performed in visit on 10/29/18   AMB POC LIPID PROFILE   Result Value Ref Range    Cholesterol (POC) 229     Triglycerides (POC) 65     HDL Cholesterol (POC) 88     Non-HDL Cholesterol 141     LDL Cholesterol (POC) 128 MG/DL    TChol/HDL Ratio (POC) 2.6    AMB POC GLUCOSE BLOOD, BY GLUCOSE MONITORING DEVICE   Result Value Ref Range    Glucose  mg/dL   AMB POC HEMOGLOBIN A1C   Result Value Ref Range    Hemoglobin A1c (POC) 10.4 %       Assessment/Plan:    ICD-10-CM ICD-9-CM    1. Screening for tuberculosis Z11.1 V74.1 AMB POC TUBERCULOSIS, INTRADERMAL (SKIN TEST)   2.  Essential hypertension I10 401.9 AMB POC LIPID PROFILE      AMB POC GLUCOSE BLOOD, BY GLUCOSE MONITORING DEVICE      AMB POC HEMOGLOBIN A1C   3. Controlled type 2 diabetes mellitus without complication, with long-term current use of insulin (HCC) E11.9 250.00 AMB POC LIPID PROFILE    Z79.4 V58.67 AMB POC GLUCOSE BLOOD, BY GLUCOSE MONITORING DEVICE      AMB POC HEMOGLOBIN A1C     Orders Placed This Encounter    AMB POC LIPID PROFILE    AMB POC GLUCOSE BLOOD, BY GLUCOSE MONITORING DEVICE    AMB POC HEMOGLOBIN A1C    AMB POC TUBERCULOSIS, INTRADERMAL (SKIN TEST)     routine labs ordered,Take 81mg aspirin daily  She needs an insulin pump trial  Patient Instructions   MyChart Activation    Thank you for requesting access to [facility]. Please follow the instructions below to securely access and download your online medical record. [facility] allows you to send messages to your doctor, view your test results, renew your prescriptions, schedule appointments, and more. How Do I Sign Up? 1. In your internet browser, go to [email or web address]  2. Click on the First Time User? Click Here link in the Sign In box. You will be redirect to the New Member Sign Up page. 3. Enter your [facility] Access Code exactly as it appears below. You will not need to use this code after youve completed the sign-up process. If you do not sign up before the expiration date, you must request a new code. [facility] Access Code: [email or web address]  Expires: 2019  2:27 PM (This is the date your [facility] access code will )    4. Enter the last four digits of your Social Security Number (xxxx) and Date of Birth (mm/dd/yyyy) as indicated and click Submit. You will be taken to the next sign-up page. 5. Create a [facility] ID. This will be your [facility] login ID and cannot be changed, so think of one that is secure and easy to remember. 6. Create a [facility] password. You can change your password at any time. 7. Enter your Password Reset Question and Answer. This can be used at a later time if you forget your password. 8. Enter your e-mail address.  You will receive e-mail notification when new information is available in [address] 9. Click Sign Up. You can now view and download portions of your medical record. 10. Click the Download Summary menu link to download a portable copy of your medical information. Additional Information    If you have questions, please visit the Frequently Asked Questions section of the [facility] website at [email or web address] Remember, [facility] is NOT to be used for urgent needs. For medical emergencies, dial 911. Follow-up Disposition:  Return in about 1 week (around 11/5/2018), or if symptoms worsen or fail to improve. I have reviewed with the patient details of the assessment and plan and all questions were answered. Relevent patient education was performed    An After Visit Summary was printed and given to the patient.

## 2019-02-27 ENCOUNTER — TELEPHONE (OUTPATIENT)
Dept: INTERNAL MEDICINE CLINIC | Age: 39
End: 2019-02-27

## 2021-06-02 ENCOUNTER — APPOINTMENT (OUTPATIENT)
Dept: GENERAL RADIOLOGY | Age: 41
End: 2021-06-02
Attending: PHYSICIAN ASSISTANT
Payer: COMMERCIAL

## 2021-06-02 ENCOUNTER — APPOINTMENT (OUTPATIENT)
Dept: VASCULAR SURGERY | Age: 41
End: 2021-06-02
Attending: PHYSICIAN ASSISTANT
Payer: COMMERCIAL

## 2021-06-02 ENCOUNTER — HOSPITAL ENCOUNTER (EMERGENCY)
Age: 41
Discharge: HOME | End: 2021-06-02
Attending: EMERGENCY MEDICINE
Payer: COMMERCIAL

## 2021-06-02 VITALS
OXYGEN SATURATION: 100 % | WEIGHT: 165 LBS | HEART RATE: 78 BPM | TEMPERATURE: 98.3 F | HEIGHT: 65 IN | BODY MASS INDEX: 27.49 KG/M2 | RESPIRATION RATE: 18 BRPM | DIASTOLIC BLOOD PRESSURE: 97 MMHG | SYSTOLIC BLOOD PRESSURE: 167 MMHG

## 2021-06-02 DIAGNOSIS — R07.89 ATYPICAL CHEST PAIN: ICD-10-CM

## 2021-06-02 DIAGNOSIS — R79.89 ABNORMAL LIVER FUNCTION TESTS: ICD-10-CM

## 2021-06-02 DIAGNOSIS — S90.32XA CONTUSION OF LEFT FOOT, INITIAL ENCOUNTER: ICD-10-CM

## 2021-06-02 DIAGNOSIS — I10 ESSENTIAL HYPERTENSION: ICD-10-CM

## 2021-06-02 DIAGNOSIS — L03.116 CELLULITIS OF LEFT LOWER EXTREMITY: Primary | ICD-10-CM

## 2021-06-02 DIAGNOSIS — E10.65 TYPE 1 DIABETES MELLITUS WITH HYPERGLYCEMIA (HCC): ICD-10-CM

## 2021-06-02 LAB
ALBUMIN SERPL-MCNC: 2.8 G/DL (ref 3.5–5)
ALBUMIN/GLOB SERPL: 0.6 {RATIO} (ref 1.1–2.2)
ALP SERPL-CCNC: 702 U/L (ref 45–117)
ALT SERPL-CCNC: 214 U/L (ref 12–78)
ANION GAP SERPL CALC-SCNC: 6 MMOL/L (ref 5–15)
AST SERPL-CCNC: 90 U/L (ref 15–37)
BASOPHILS # BLD: 0.1 K/UL (ref 0–0.1)
BASOPHILS NFR BLD: 1 % (ref 0–1)
BILIRUB SERPL-MCNC: 0.3 MG/DL (ref 0.2–1)
BNP SERPL-MCNC: 20 PG/ML (ref 0–125)
BUN SERPL-MCNC: 16 MG/DL (ref 6–20)
BUN/CREAT SERPL: 14 (ref 12–20)
CALCIUM SERPL-MCNC: 9.2 MG/DL (ref 8.5–10.1)
CHLORIDE SERPL-SCNC: 104 MMOL/L (ref 97–108)
CK MB CFR SERPL CALC: 0.5 % (ref 0–2.5)
CK MB SERPL-MCNC: 1.2 NG/ML (ref 5–25)
CK SERPL-CCNC: 220 U/L (ref 26–192)
CO2 SERPL-SCNC: 30 MMOL/L (ref 21–32)
CREAT SERPL-MCNC: 1.12 MG/DL (ref 0.55–1.02)
DIFFERENTIAL METHOD BLD: NORMAL
EOSINOPHIL # BLD: 0.2 K/UL (ref 0–0.4)
EOSINOPHIL NFR BLD: 2 % (ref 0–7)
ERYTHROCYTE [DISTWIDTH] IN BLOOD BY AUTOMATED COUNT: 12.9 % (ref 11.5–14.5)
GLOBULIN SER CALC-MCNC: 4.7 G/DL (ref 2–4)
GLUCOSE SERPL-MCNC: 241 MG/DL (ref 65–100)
HCT VFR BLD AUTO: 36.9 % (ref 35–47)
HGB BLD-MCNC: 12.4 G/DL (ref 11.5–16)
IMM GRANULOCYTES # BLD AUTO: 0 K/UL (ref 0–0.04)
IMM GRANULOCYTES NFR BLD AUTO: 0 % (ref 0–0.5)
LYMPHOCYTES # BLD: 3 K/UL (ref 0.8–3.5)
LYMPHOCYTES NFR BLD: 37 % (ref 12–49)
MCH RBC QN AUTO: 30 PG (ref 26–34)
MCHC RBC AUTO-ENTMCNC: 33.6 G/DL (ref 30–36.5)
MCV RBC AUTO: 89.1 FL (ref 80–99)
MONOCYTES # BLD: 0.7 K/UL (ref 0–1)
MONOCYTES NFR BLD: 8 % (ref 5–13)
NEUTS SEG # BLD: 4.2 K/UL (ref 1.8–8)
NEUTS SEG NFR BLD: 52 % (ref 32–75)
NRBC # BLD: 0 K/UL (ref 0–0.01)
NRBC BLD-RTO: 0 PER 100 WBC
PLATELET # BLD AUTO: 384 K/UL (ref 150–400)
PMV BLD AUTO: 11.5 FL (ref 8.9–12.9)
POTASSIUM SERPL-SCNC: 4.7 MMOL/L (ref 3.5–5.1)
PROT SERPL-MCNC: 7.5 G/DL (ref 6.4–8.2)
RBC # BLD AUTO: 4.14 M/UL (ref 3.8–5.2)
SODIUM SERPL-SCNC: 140 MMOL/L (ref 136–145)
TROPONIN I SERPL-MCNC: <0.05 NG/ML
WBC # BLD AUTO: 8.1 K/UL (ref 3.6–11)

## 2021-06-02 PROCEDURE — 84484 ASSAY OF TROPONIN QUANT: CPT

## 2021-06-02 PROCEDURE — 93005 ELECTROCARDIOGRAM TRACING: CPT

## 2021-06-02 PROCEDURE — 36415 COLL VENOUS BLD VENIPUNCTURE: CPT

## 2021-06-02 PROCEDURE — 71045 X-RAY EXAM CHEST 1 VIEW: CPT

## 2021-06-02 PROCEDURE — 73630 X-RAY EXAM OF FOOT: CPT

## 2021-06-02 PROCEDURE — 73590 X-RAY EXAM OF LOWER LEG: CPT

## 2021-06-02 PROCEDURE — 82550 ASSAY OF CK (CPK): CPT

## 2021-06-02 PROCEDURE — 93971 EXTREMITY STUDY: CPT

## 2021-06-02 PROCEDURE — 82553 CREATINE MB FRACTION: CPT

## 2021-06-02 PROCEDURE — 80053 COMPREHEN METABOLIC PANEL: CPT

## 2021-06-02 PROCEDURE — 99284 EMERGENCY DEPT VISIT MOD MDM: CPT

## 2021-06-02 PROCEDURE — 85025 COMPLETE CBC W/AUTO DIFF WBC: CPT

## 2021-06-02 PROCEDURE — 93971 EXTREMITY STUDY: CPT | Performed by: INTERNAL MEDICINE

## 2021-06-02 PROCEDURE — 83880 ASSAY OF NATRIURETIC PEPTIDE: CPT

## 2021-06-02 RX ORDER — CLINDAMYCIN HYDROCHLORIDE 300 MG/1
300 CAPSULE ORAL 4 TIMES DAILY
Qty: 28 CAPSULE | Refills: 0 | Status: SHIPPED | OUTPATIENT
Start: 2021-06-02 | End: 2021-06-09

## 2021-06-02 NOTE — DISCHARGE INSTRUCTIONS
It was a pleasure taking care of you in our Emergency Department today. We know that when you come to [address] [place] [address], you are entrusting us with your health, comfort, and safety. Our physicians and nurses honor that trust, and truly appreciate the opportunity to care for you and your loved ones. We also value your feedback. If you receive a survey about your Emergency Department experience today, please fill it out. We care about our patients' feedback, and we listen to what you have to say. Thank you!

## 2021-06-02 NOTE — LETTER
[facility] EMERGENCY DEPT 
[address] [facility] [address] 
[phone] Work/School Note Date: 6/2/2021 To Whom It May concern: 
 
Merari Kebede was seen and treated today in the emergency room by the following provider(s): 
Physician Assistant: Shae Alcantar PA-C. Merari Kebede may return to work on 6/5/2021. Sincerely, Champ Powers RN

## 2021-06-02 NOTE — ED PROVIDER NOTES
EMERGENCY DEPARTMENT HISTORY AND PHYSICAL EXAM      Date: 6/2/2021  Patient Name: Brady Clemons    History of Presenting Illness     Chief Complaint   Patient presents with    Leg Swelling     History Provided By: Patient    HPI: Brady Clemons, 47 y.o. female with medical history significant for insulin-dependent diabetes, asthma, HTN, headache who presents via self to the ED with cc of acute moderate aching left lower extremity pain and swelling worsening X 1 week. Patient endorses that she dropped a hose nozzle on her left dorsal foot 1 month ago. Additionally endorses that she noticed a rash to her left lateral lower extremity 2 weeks ago and suspected bug bite. She then additionally fell and injured the medial aspect of her left lower extremity 1 week prior to arrival.  States that swelling started after this is recent injury. Patient additionally endorses chest pressure X 2 days. Denies any fever, chills, nausea, vomiting, numbness, tingling, palpitations, shortness of breath, wheezing, lightheadedness, dizziness, syncope, seizure, dyspnea on exertion, PND, orthopnea. No medications or alleviating factors. Denies any history of cardiac disease. PCP: Nba Islas MD    There are no other complaints, changes, or physical findings at this time. No current facility-administered medications on file prior to encounter. Current Outpatient Medications on File Prior to Encounter   Medication Sig Dispense Refill    insulin NPH (HUMULIN N NPH U-100 INSULIN) 100 unit/mL injection INJECT 30 UNITS UNDER SKIN IN AM AND 25 AT NIGHT 10 mL 12    albuterol (PROVENTIL HFA, VENTOLIN HFA, PROAIR HFA) 90 mcg/actuation inhaler Take 2 Puffs by inhalation every four (4) hours as needed for Wheezing.  1 Inhaler 12    Insulin Needles, Disposable, 29 gauge x 1/2\" ndle Use as directed 100 Pen Needle 12    Blood-Glucose Meter (CONTOUR METER) monitoring kit Use as directed 1 Kit 0    Lancets Misc As directed 1 Package 11    insulin regular (HUMULIN R REGULAR U-100 INSULN) 100 unit/mL injection INJECT 20 UNITS UNDER SKIN BEFORE BREAKFAST AND DINNER (Patient not taking: Reported on 2021) 10 mL 12    butalbital-acetaminophen-caffeine (FIORICET, ESGIC) -40 mg per tablet Take 1 Tab by mouth every four (4) hours as needed for Headache. (Patient not taking: Reported on 10/29/2018) 12 Tab 0    cyclobenzaprine (FLEXERIL) 10 mg tablet Take 1 Tab by mouth three (3) times daily as needed for Muscle Spasm(s). (Patient not taking: Reported on 10/29/2018) 15 Tab 0    Blood-Glucose Meter monitoring kit Use daily as directed. E10.9    TYPE: one (Patient not taking: Reported on 2021) 1 Kit 0    losartan (COZAAR) 25 mg tablet Take 1 Tab by mouth daily. (Patient not taking: Reported on 10/29/2018) 30 Tab 12    glucose blood VI test strips (TRUETRACK TEST) strip Check blood glucose 2-3 times a day (Patient not taking: Reported on 2021) 100 Strip 3    montelukast (SINGULAIR) 10 mg tablet Take 1 Tab by mouth daily. (Patient not taking: Reported on 10/29/2018) 30 Tab 12    EPINEPHrine (EPIPEN 2-SUMAYA) 0.3 mg/0.3 mL injection 0.3 mL by IntraMUSCular route once as needed for up to 1 dose.  (Patient not taking: Reported on 2021) 2 Syringe 3    Insulin Syringe-Needle U-100 (BD INSULIN SYRINGE SAFETYGLIDE) 0.5 mL 29 gauge x 1/2\" syrg Use with insulin (Patient not taking: Reported on 2021) 120 Syringe 3    glucose blood VI test strips (ASCENSIA CONTOUR) strip Test blood sugar 3 times daily (Patient not taking: Reported on 2021) 100 Strip 12     Past History     Past Medical History:  Past Medical History:   Diagnosis Date    Asthma     Diabetes ([address]     Type 1    Headache      Past Surgical History:  Past Surgical History:   Procedure Laterality Date    HX  SECTION      HX GYN  2010    L ectopic pregnancy, L fallopian tube removed     Family History:  Family History   Problem Relation Age of Onset    Diabetes Mother     Hypertension Mother     Diabetes Father     Hypertension Father      Social History:  Social History     Tobacco Use    Smoking status: Never Smoker    Smokeless tobacco: Never Used   Substance Use Topics    Alcohol use: No    Drug use: No     Allergies: Allergies   Allergen Reactions    Doxycycline Hives     Review of Systems   Review of Systems   Constitutional: Negative for activity change, appetite change, chills, diaphoresis, fatigue and fever. HENT: Negative for congestion, rhinorrhea, sinus pressure, sinus pain, sneezing, sore throat, tinnitus, trouble swallowing and voice change. Eyes: Negative. Negative for photophobia, redness and visual disturbance. Respiratory: Negative. Negative for apnea, cough, choking, chest tightness, shortness of breath, wheezing and stridor. Cardiovascular: Positive for chest pain and leg swelling. Negative for palpitations. Gastrointestinal: Negative. Negative for abdominal pain, diarrhea, nausea and vomiting. Genitourinary: Negative. Negative for decreased urine volume and difficulty urinating. Musculoskeletal: Positive for arthralgias and joint swelling. Negative for back pain, gait problem, myalgias, neck pain and neck stiffness. Skin: Positive for color change and rash. Negative for pallor and wound. Neurological: Negative. Negative for weakness, light-headedness, numbness and headaches. Psychiatric/Behavioral: Negative. Negative for confusion. Physical Exam   Physical Exam  Vitals and nursing note reviewed. Constitutional:       General: She is not in acute distress. Appearance: Normal appearance. She is well-developed. She is not ill-appearing, toxic-appearing or diaphoretic. HENT:      Head: Normocephalic and atraumatic.       Right Ear: Hearing and external ear normal.      Left Ear: Hearing and external ear normal.      Nose: Nose normal.   Eyes:      Conjunctiva/sclera: Conjunctivae normal.      Pupils: Pupils are equal, round, and reactive to light. Cardiovascular:      Rate and Rhythm: Normal rate and regular rhythm. Pulses: Normal pulses. Dorsalis pedis pulses are 2+ on the left side. Posterior tibial pulses are 2+ on the right side and 2+ on the left side. Heart sounds: Normal heart sounds. Pulmonary:      Effort: Pulmonary effort is normal. No respiratory distress. Breath sounds: Normal breath sounds. No stridor. No wheezing, rhonchi or rales. Chest:      Chest wall: No tenderness. Abdominal:      General: Bowel sounds are normal. There is no distension. Palpations: Abdomen is soft. There is no mass. Tenderness: There is no abdominal tenderness. There is no guarding. Musculoskeletal:         General: Normal range of motion. Cervical back: Normal range of motion. Right knee: Normal.      Left knee: Normal. No swelling, effusion, erythema, ecchymosis, lacerations, bony tenderness or crepitus. Normal range of motion. No tenderness. Right lower leg: Normal.      Left lower leg: Swelling, tenderness and bony tenderness present. No deformity or lacerations. 2+ Pitting Edema present. Right ankle: Normal.      Left ankle: Swelling present. No deformity, ecchymosis or lacerations. No tenderness. Normal range of motion. Normal pulse. Left Achilles Tendon: Normal. No tenderness. Right foot: Normal.      Left foot: Normal range of motion and normal capillary refill. Swelling, tenderness and bony tenderness present. No deformity, bunion, Charcot foot, foot drop, prominent metatarsal heads, laceration or crepitus. Normal pulse. Legs:    Feet:      Left foot:      Skin integrity: No ulcer, blister, skin breakdown, erythema, warmth, callus, dry skin or fissure. Toenail Condition: Left toenails are normal.   Skin:     General: Skin is warm and dry. Findings: Rash present.    Neurological: Mental Status: She is alert and oriented to person, place, and time. Psychiatric:         Behavior: Behavior normal.         Thought Content: Thought content normal.         Judgment: Judgment normal.       Diagnostic Study Results   Labs -     Recent Results (from the past 12 hour(s))   CBC WITH AUTOMATED DIFF    Collection Time: 06/02/21  5:46 PM   Result Value Ref Range    WBC 8.1 3.6 - 11.0 K/uL    RBC 4.14 3.80 - 5.20 M/uL    HGB 12.4 11.5 - 16.0 g/dL    HCT 36.9 35.0 - 47.0 %    MCV 89.1 80.0 - 99.0 FL    MCH 30.0 26.0 - 34.0 PG    MCHC 33.6 30.0 - 36.5 g/dL    RDW 12.9 11.5 - 14.5 %    PLATELET 017 374 - 902 K/uL    MPV 11.5 8.9 - 12.9 FL    NRBC 0.0 0  WBC    ABSOLUTE NRBC 0.00 0.00 - 0.01 K/uL    NEUTROPHILS 52 32 - 75 %    LYMPHOCYTES 37 12 - 49 %    MONOCYTES 8 5 - 13 %    EOSINOPHILS 2 0 - 7 %    BASOPHILS 1 0 - 1 %    IMMATURE GRANULOCYTES 0 0.0 - 0.5 %    ABS. NEUTROPHILS 4.2 1.8 - 8.0 K/UL    ABS. LYMPHOCYTES 3.0 0.8 - 3.5 K/UL    ABS. MONOCYTES 0.7 0.0 - 1.0 K/UL    ABS. EOSINOPHILS 0.2 0.0 - 0.4 K/UL    ABS. BASOPHILS 0.1 0.0 - 0.1 K/UL    ABS. IMM. GRANS. 0.0 0.00 - 0.04 K/UL    DF AUTOMATED     METABOLIC PANEL, COMPREHENSIVE    Collection Time: 06/02/21  5:46 PM   Result Value Ref Range    Sodium 140 136 - 145 mmol/L    Potassium 4.7 3.5 - 5.1 mmol/L    Chloride 104 97 - 108 mmol/L    CO2 30 21 - 32 mmol/L    Anion gap 6 5 - 15 mmol/L    Glucose 241 (H) 65 - 100 mg/dL    BUN 16 6 - 20 MG/DL    Creatinine 1.12 (H) 0.55 - 1.02 MG/DL    BUN/Creatinine ratio 14 12 - 20      GFR est AA >60 >60 ml/min/1.73m2    GFR est non-AA 54 (L) >60 ml/min/1.73m2    Calcium 9.2 8.5 - 10.1 MG/DL    Bilirubin, total 0.3 0.2 - 1.0 MG/DL    ALT (SGPT) 214 (H) 12 - 78 U/L    AST (SGOT) 90 (H) 15 - 37 U/L    Alk.  phosphatase 702 (H) 45 - 117 U/L    Protein, total 7.5 6.4 - 8.2 g/dL    Albumin 2.8 (L) 3.5 - 5.0 g/dL    Globulin 4.7 (H) 2.0 - 4.0 g/dL    A-G Ratio 0.6 (L) 1.1 - 2.2     NT-PRO BNP    Collection Time: 06/02/21  5:46 PM   Result Value Ref Range    NT pro-BNP 20 0 - 125 PG/ML   TROPONIN I    Collection Time: 06/02/21  5:46 PM   Result Value Ref Range    Troponin-I, Qt. <0.05 <0.05 ng/mL   CK W/ REFLX CKMB    Collection Time: 06/02/21  5:46 PM   Result Value Ref Range     (H) 26 - 192 U/L   CK-MB,QUANT. Collection Time: 06/02/21  5:46 PM   Result Value Ref Range    CK - MB 1.2 <3.6 NG/ML    CK-MB Index 0.5 0.0 - 2.5         Radiologic Studies -   XR CHEST PORT   Final Result   No acute abnormality            XR FOOT LT MIN 3 V   Final Result   No acute abnormality. XR TIB/FIB LT   Final Result   No acute abnormality. XR TIB/FIB LT    Result Date: 6/2/2021  No acute abnormality. XR FOOT LT MIN 3 V    Result Date: 6/2/2021  No acute abnormality. XR CHEST PORT    Result Date: 6/2/2021  No acute abnormality     Medical Decision Making   I am the first provider for this patient. I reviewed the vital signs, available nursing notes, past medical history, past surgical history, family history and social history. Vital Signs-Reviewed the patient's vital signs. Patient Vitals for the past 24 hrs:   Temp Pulse Resp BP SpO2   06/02/21 1729    (!) 179/101    06/02/21 1720 98.3 °F (36.8 °C) 88 18 (!) 161/87 100 %     Pulse Oximetry Analysis - 100% on RA (normal)      EKG interpretation: (Preliminary)  Rhythm: normal sinus rhythm; and regular . Rate (approx.): 79; Axis: normal; IA interval: normal; QRS interval: normal ; ST/T wave: normal;; Other findings: non-ischemic. Records Reviewed: Nursing Notes, Old Medical Records, Previous electrocardiograms, Previous Radiology Studies and Previous Laboratory Studies    Provider Notes (Medical Decision Making):   Patient presents with lower leg edema and pain. DDx include Lymphedema, DVT, cellulitis, leg cramping 2/2 low K, CHF, ARF, liver failure, PAD. Will get labs and Doppler Leg PRN.   I am suspicious for infection based on exam including rash noted to left lateral lower extremity. Plan to treat with antibiotics as needed. Patient presents with CP. DDx:  ACS, Aortic dissection, PNA, PE, PTX, pericarditis, myocarditis, GERD, costochondritis, anxiety, HTN. Will obtain labs, CXR, EKG and get Cardiology Consult PRN. - I have re-examined the patient and the patient denies chest pain on re-examination. The patient has had onset of chest pain greater than 8 hours and one negative set of cardiac enzymes or 2 negative sets of cardiac enzymes in the ER during this visit. The diagnosis, follow up, return instructions, test results, x-rays and medications have been discussed and reviewed with the patient. The patient has been given the opportunity to ask questions. The patient  expresses understanding of the diagnosis, follow-up and return instructions. The patient agrees to follow up with primary care or cardiology as directed and to return immediately if the chest pain worsens. The patient expresses understanding that although cardiac testing at this time is negative, a cardiac problem could still be present and that a follow-up appointment for further evaluation and risk factor modification is necessary to complete the evaluation of this complaint. ED Course:   Initial assessment performed. The patients presenting problems have been discussed, and they are in agreement with the care plan formulated and outlined with them. I have encouraged them to ask questions as they arise throughout their visit. ED Course as of Jun 02 1845 Wed Jun 02, 2021   1808 EKG per my interpretation normal sinus rhythm, rate 79 bpm, leftward axis, no acute ischemic changes, no interval changes. [AK]   [id number] Vascular technician endorses patient is negative for DVT in left lower extremity. [SM]   9417 Pt resting comfortably in room in NAD. No new symptoms or complaints at this time.  Available labs/ results reviewed with pt. specifically discussed with patient elevated liver enzymes. She endorses that she has a history of elevated liver enzymes and was evaluated in her 25s by her PCP. She had multiple tests including testing for hepatitis. She additionally had a liver biopsy did not show any significant diagnoses. States that she was informed by [facility] a couple years ago that her liver enzymes are elevated again. She endorses that she never followed up with her PCP or specialist in regards to this. She endorses that she understands her enzymes are elevated today and she needs to follow-up with specialist as well as her PCP. She specifically denies any abdominal pain, nausea, vomiting, constipation, diarrhea or other acute symptoms at this time. [SM]      ED Course User Index  [AK] Ciara Menon MD  [] Jass Banuelos PA-C       Progress Note:   Updated pt on all returned results and findings. Discussed the importance of proper follow up as referred below along with return precautions. Pt in agreement with the care plan and expresses agreement with and understanding of all items discussed. Disposition:  6:45 PM  I have discussed with patient their diagnosis, treatment, and follow up plan. The patient agrees to follow up as outlined in discharge paperwork and also to return to the ED with any worsening. Shahzad Bills PA-C      PLAN:  1. Current Discharge Medication List      START taking these medications    Details   clindamycin (CLEOCIN) 300 mg capsule Take 1 Capsule by mouth four (4) times daily for 7 days. Qty: 28 Capsule, Refills: 0  Start date: 6/2/2021, End date: 6/9/2021           2.    Follow-up Information     Follow up With Specialties Details Why Contact Info    Lilia Santiago., MD Internal Medicine Schedule an appointment as soon as possible for a visit in 2 days As needed [address] [id number] [facility] EMERGENCY DEPT Emergency Medicine Go to  As needed, If symptoms worsen [address] [facility]  [phone]        Return to ED if worse     Diagnosis     Clinical Impression:   1. Cellulitis of left lower extremity    2. Abnormal liver function tests    3. Type 1 diabetes mellitus with hyperglycemia (HCC)    4. Essential hypertension    5. Atypical chest pain    6. Contusion of left foot, initial encounter            Please note that this dictation was completed with Dragon, computer voice recognition software. Quite often unanticipated grammatical, syntax, homophones, and other interpretive errors are inadvertently transcribed by the computer software. Please disregard these errors. Additionally, please excuse any errors that have escaped final proofreading.

## 2021-06-02 NOTE — ED TRIAGE NOTES
Patient presents to the ED with c/o left leg swelling x1 weeks. Pt reports soaking her leg in water and epsom salt. Pt reports dropping the water hose nozzle on her foot. Pt has an area of redness and swelling to an area and thinks a bug may have bitten her.

## 2021-06-02 NOTE — ED NOTES
Pt presents to ED ambulatory complaining of 3 falls over the past month and left leg pain after dropping a water hose on her leg. Pt also reports mid chest pressure. Pt is alert and oriented x 4, RR even and unlabored, skin is warm and dry. Assessment completed and pt updated on plan of care. Call bell in reach. Emergency Department Nursing Plan of Care       The Nursing Plan of Care is developed from the Nursing assessment and Emergency Department Attending provider initial evaluation. The plan of care may be reviewed in the ED Provider note.     The Plan of Care was developed with the following considerations:   Patient / Family readiness to learn indicated by:verbalized understanding  Persons(s) to be included in education: patient  Barriers to Learning/Limitations:No    Signed     Ariela Piedra RN    6/2/2021   5:41 PM

## 2021-06-02 NOTE — ED NOTES
Discharge instructions were given to the patient by Natalie Mills. The patient left the Emergency Department ambulatory, alert and oriented and in no acute distress with 1 prescriptions. The patient was encouraged to call or return to the ED for worsening issues or problems and was encouraged to schedule a follow up appointment for continuing care. The patient verbalized understanding of discharge instructions and prescriptions, all questions were answered. The patient has no further concerns at this time.

## 2021-06-04 LAB
ATRIAL RATE: 79 BPM
CALCULATED P AXIS, ECG09: 55 DEGREES
CALCULATED R AXIS, ECG10: -20 DEGREES
CALCULATED T AXIS, ECG11: 24 DEGREES
DIAGNOSIS, 93000: NORMAL
P-R INTERVAL, ECG05: 166 MS
Q-T INTERVAL, ECG07: 370 MS
QRS DURATION, ECG06: 76 MS
QTC CALCULATION (BEZET), ECG08: 424 MS
VENTRICULAR RATE, ECG03: 79 BPM

## 2021-06-08 ENCOUNTER — APPOINTMENT (OUTPATIENT)
Dept: GENERAL RADIOLOGY | Age: 41
End: 2021-06-08
Attending: PHYSICIAN ASSISTANT
Payer: COMMERCIAL

## 2021-06-08 ENCOUNTER — APPOINTMENT (OUTPATIENT)
Dept: VASCULAR SURGERY | Age: 41
End: 2021-06-08
Attending: PHYSICIAN ASSISTANT
Payer: COMMERCIAL

## 2021-06-08 ENCOUNTER — HOSPITAL ENCOUNTER (EMERGENCY)
Age: 41
Discharge: HOME | End: 2021-06-08
Attending: EMERGENCY MEDICINE
Payer: COMMERCIAL

## 2021-06-08 VITALS
HEIGHT: 66 IN | BODY MASS INDEX: 27.64 KG/M2 | OXYGEN SATURATION: 99 % | SYSTOLIC BLOOD PRESSURE: 122 MMHG | WEIGHT: 172 LBS | HEART RATE: 94 BPM | RESPIRATION RATE: 22 BRPM | DIASTOLIC BLOOD PRESSURE: 74 MMHG | TEMPERATURE: 97.7 F

## 2021-06-08 DIAGNOSIS — R07.89 ATYPICAL CHEST PAIN: Primary | ICD-10-CM

## 2021-06-08 DIAGNOSIS — R60.0 LEG EDEMA, LEFT: ICD-10-CM

## 2021-06-08 DIAGNOSIS — L03.116 CELLULITIS OF LEFT LOWER EXTREMITY: ICD-10-CM

## 2021-06-08 LAB
ALBUMIN SERPL-MCNC: 2.9 G/DL (ref 3.5–5)
ALBUMIN/GLOB SERPL: 0.7 {RATIO} (ref 1.1–2.2)
ALP SERPL-CCNC: 685 U/L (ref 45–117)
ALT SERPL-CCNC: 173 U/L (ref 12–78)
ANION GAP SERPL CALC-SCNC: 8 MMOL/L (ref 5–15)
AST SERPL-CCNC: 82 U/L (ref 15–37)
BASOPHILS # BLD: 0.1 K/UL (ref 0–0.1)
BASOPHILS NFR BLD: 1 % (ref 0–1)
BILIRUB SERPL-MCNC: 0.2 MG/DL (ref 0.2–1)
BUN SERPL-MCNC: 17 MG/DL (ref 6–20)
BUN/CREAT SERPL: 20 (ref 12–20)
CALCIUM SERPL-MCNC: 9.1 MG/DL (ref 8.5–10.1)
CHLORIDE SERPL-SCNC: 105 MMOL/L (ref 97–108)
CO2 SERPL-SCNC: 28 MMOL/L (ref 21–32)
CREAT SERPL-MCNC: 0.84 MG/DL (ref 0.55–1.02)
DIFFERENTIAL METHOD BLD: ABNORMAL
EOSINOPHIL # BLD: 0.2 K/UL (ref 0–0.4)
EOSINOPHIL NFR BLD: 2 % (ref 0–7)
ERYTHROCYTE [DISTWIDTH] IN BLOOD BY AUTOMATED COUNT: 12.6 % (ref 11.5–14.5)
GLOBULIN SER CALC-MCNC: 4.3 G/DL (ref 2–4)
GLUCOSE SERPL-MCNC: 134 MG/DL (ref 65–100)
HCG UR QL: NEGATIVE
HCT VFR BLD AUTO: 38.6 % (ref 35–47)
HGB BLD-MCNC: 13 G/DL (ref 11.5–16)
IMM GRANULOCYTES # BLD AUTO: 0 K/UL (ref 0–0.04)
IMM GRANULOCYTES NFR BLD AUTO: 0 % (ref 0–0.5)
LYMPHOCYTES # BLD: 3.6 K/UL (ref 0.8–3.5)
LYMPHOCYTES NFR BLD: 34 % (ref 12–49)
MCH RBC QN AUTO: 30 PG (ref 26–34)
MCHC RBC AUTO-ENTMCNC: 33.7 G/DL (ref 30–36.5)
MCV RBC AUTO: 89.1 FL (ref 80–99)
MONOCYTES # BLD: 0.7 K/UL (ref 0–1)
MONOCYTES NFR BLD: 7 % (ref 5–13)
NEUTS SEG # BLD: 5.9 K/UL (ref 1.8–8)
NEUTS SEG NFR BLD: 56 % (ref 32–75)
NRBC # BLD: 0 K/UL (ref 0–0.01)
NRBC BLD-RTO: 0 PER 100 WBC
PLATELET # BLD AUTO: 370 K/UL (ref 150–400)
PMV BLD AUTO: 10.6 FL (ref 8.9–12.9)
POTASSIUM SERPL-SCNC: 3.8 MMOL/L (ref 3.5–5.1)
PROT SERPL-MCNC: 7.2 G/DL (ref 6.4–8.2)
RBC # BLD AUTO: 4.33 M/UL (ref 3.8–5.2)
SODIUM SERPL-SCNC: 141 MMOL/L (ref 136–145)
TROPONIN I SERPL-MCNC: <0.05 NG/ML
WBC # BLD AUTO: 10.4 K/UL (ref 3.6–11)

## 2021-06-08 PROCEDURE — 84484 ASSAY OF TROPONIN QUANT: CPT

## 2021-06-08 PROCEDURE — 96374 THER/PROPH/DIAG INJ IV PUSH: CPT

## 2021-06-08 PROCEDURE — 71045 X-RAY EXAM CHEST 1 VIEW: CPT

## 2021-06-08 PROCEDURE — 74011250636 HC RX REV CODE- 250/636: Performed by: PHYSICIAN ASSISTANT

## 2021-06-08 PROCEDURE — 93971 EXTREMITY STUDY: CPT

## 2021-06-08 PROCEDURE — 85025 COMPLETE CBC W/AUTO DIFF WBC: CPT

## 2021-06-08 PROCEDURE — 93005 ELECTROCARDIOGRAM TRACING: CPT

## 2021-06-08 PROCEDURE — 81025 URINE PREGNANCY TEST: CPT

## 2021-06-08 PROCEDURE — 80053 COMPREHEN METABOLIC PANEL: CPT

## 2021-06-08 PROCEDURE — 99283 EMERGENCY DEPT VISIT LOW MDM: CPT

## 2021-06-08 PROCEDURE — 36415 COLL VENOUS BLD VENIPUNCTURE: CPT

## 2021-06-08 RX ORDER — KETOROLAC TROMETHAMINE 30 MG/ML
30 INJECTION, SOLUTION INTRAMUSCULAR; INTRAVENOUS ONCE
Status: COMPLETED | OUTPATIENT
Start: 2021-06-08 | End: 2021-06-08

## 2021-06-08 RX ADMIN — KETOROLAC TROMETHAMINE 30 MG: 30 INJECTION, SOLUTION INTRAMUSCULAR; INTRAVENOUS at 20:51

## 2021-06-08 NOTE — Clinical Note
[facility] EMERGENCY DEPT 
[address]  [address] 
[phone] Work/School Note Date: 6/8/2021 To Whom It May concern: 
 
Kacy Rhodes was seen and treated today in the emergency room by the following provider(s): 
Attending Provider: Irene Becker MD 
Physician Assistant: Jamee Hoffmann. Kacy Rhodes is excused from work/school on 06/08/21 and 06/09/21. She is medically clear to return to work/school on 6/10/2021. Sincerely, Adan Mathew, Jamee Monahan

## 2021-06-08 NOTE — ED PROVIDER NOTES
EMERGENCY DEPARTMENT HISTORY AND PHYSICAL EXAM      Date: 6/8/2021  Patient Name: Adan Nieves    History of Presenting Illness     Chief Complaint   Patient presents with    Medication Reaction       History Provided By: Patient    HPI: Adan Nieves, 47 y.o. female with PMHx of DM1, asthma, presents BIB self to the ED with cc of chest pressure x 1 week. Pt describes the sensation as \"tightness\" felt in the center of her chest. It does not appear to be exertional. There is associated SOB. Pt denies fever, cough, wheezing, ST, abd pain, N/V/D. Pt was evaluated in this ED 5 days ago for LLE swelling, cellulitis, and chest pain. At that time she had similar chest pain, though it was less severe. She had a LLE negative doppler, nl EKG, neg trop. She was d/c home on clindamycin, which seems to be helping her L leg infection. She is wondering if the clindamycin is related to her worsening chest pressure. Denies significant cardiac history. Pt has not yet followed up with a PCP. There are no other complaints, changes, or physical findings at this time. PCP: Collin Hung., MD    No current facility-administered medications on file prior to encounter. Current Outpatient Medications on File Prior to Encounter   Medication Sig Dispense Refill    clindamycin (CLEOCIN) 300 mg capsule Take 1 Capsule by mouth four (4) times daily for 7 days. 28 Capsule 0    insulin NPH (HUMULIN N NPH U-100 INSULIN) 100 unit/mL injection INJECT 30 UNITS UNDER SKIN IN AM AND 25 AT NIGHT 10 mL 12    insulin regular (HUMULIN R REGULAR U-100 INSULN) 100 unit/mL injection INJECT 20 UNITS UNDER SKIN BEFORE BREAKFAST AND DINNER (Patient not taking: Reported on 6/2/2021) 10 mL 12    butalbital-acetaminophen-caffeine (FIORICET, ESGIC) -40 mg per tablet Take 1 Tab by mouth every four (4) hours as needed for Headache.  (Patient not taking: Reported on 10/29/2018) 12 Tab 0    cyclobenzaprine (FLEXERIL) 10 mg tablet Take 1 Tab by mouth three (3) times daily as needed for Muscle Spasm(s). (Patient not taking: Reported on 10/29/2018) 15 Tab 0    albuterol (PROVENTIL HFA, VENTOLIN HFA, PROAIR HFA) 90 mcg/actuation inhaler Take 2 Puffs by inhalation every four (4) hours as needed for Wheezing. 1 Inhaler 12    Blood-Glucose Meter monitoring kit Use daily as directed. E10.9    TYPE: one (Patient not taking: Reported on 2021) 1 Kit 0    losartan (COZAAR) 25 mg tablet Take 1 Tab by mouth daily. (Patient not taking: Reported on 10/29/2018) 30 Tab 12    glucose blood VI test strips (TRUETRACK TEST) strip Check blood glucose 2-3 times a day (Patient not taking: Reported on 2021) 100 Strip 3    montelukast (SINGULAIR) 10 mg tablet Take 1 Tab by mouth daily. (Patient not taking: Reported on 10/29/2018) 30 Tab 12    Insulin Needles, Disposable, 29 gauge x 1/2\" ndle Use as directed 100 Pen Needle 12    EPINEPHrine (EPIPEN 2-SUMAYA) 0.3 mg/0.3 mL injection 0.3 mL by IntraMUSCular route once as needed for up to 1 dose.  (Patient not taking: Reported on 2021) 2 Syringe 3    Insulin Syringe-Needle U-100 (BD INSULIN SYRINGE SAFETYGLIDE) 0.5 mL 29 gauge x 1/2\" syrg Use with insulin (Patient not taking: Reported on 2021) 120 Syringe 3    glucose blood VI test strips (ASCENSIA CONTOUR) strip Test blood sugar 3 times daily (Patient not taking: Reported on 2021) 100 Strip 12    Blood-Glucose Meter (CONTOUR METER) monitoring kit Use as directed 1 Kit 0    Lancets Misc As directed 1 Package 11       Past History     Past Medical History:  Past Medical History:   Diagnosis Date    Asthma     Diabetes ([address]     Type 1    Headache        Past Surgical History:  Past Surgical History:   Procedure Laterality Date    HX  SECTION      HX GYN      L ectopic pregnancy, L fallopian tube removed       Family History:  Family History   Problem Relation Age of Onset    Diabetes Mother     Hypertension Mother     Diabetes Father     Hypertension Father        Social History:  Social History     Tobacco Use    Smoking status: Never Smoker    Smokeless tobacco: Never Used   Substance Use Topics    Alcohol use: No    Drug use: No       Allergies: Allergies   Allergen Reactions    Doxycycline Hives         Review of Systems   Review of Systems   Constitutional: Negative for fever. HENT: Negative for congestion. Eyes: Negative for visual disturbance. Respiratory: Positive for shortness of breath. Cardiovascular: Positive for chest pain (\"tightness\"). Gastrointestinal: Negative for abdominal pain and vomiting. Endocrine: Negative for polydipsia. +DM   Genitourinary: Negative for dysuria. Musculoskeletal: Negative for gait problem. Skin: Positive for wound (LLE). Allergic/Immunologic:         -- Doxycycline -- Hives   Neurological: Negative for dizziness. Hematological: Does not bruise/bleed easily. Psychiatric/Behavioral: Negative for agitation. Physical Exam   Physical Exam  Vitals and nursing note reviewed. Constitutional:       Appearance: Normal appearance. She is well-developed. She is not toxic-appearing. Comments: Slightly anxious but no acute distress   HENT:      Head: Normocephalic and atraumatic. Nose: Nose normal.      Mouth/Throat:      Mouth: Mucous membranes are moist.   Eyes:      General: Lids are normal.      Extraocular Movements: Extraocular movements intact. Conjunctiva/sclera: Conjunctivae normal.      Pupils: Pupils are equal, round, and reactive to light. Cardiovascular:      Rate and Rhythm: Normal rate and regular rhythm. Pulses:           Dorsalis pedis pulses are 2+ on the right side and 2+ on the left side. Heart sounds: No murmur heard. No friction rub. Pulmonary:      Effort: Pulmonary effort is normal.      Breath sounds: Normal breath sounds. Abdominal:      Palpations: Abdomen is soft. Tenderness:  There is no abdominal tenderness. There is no guarding. Musculoskeletal:         General: Normal range of motion. Cervical back: Normal range of motion and neck supple. Comments: Lateral aspect of left lower leg with old wound. There is surrounding erythema. The wound appears to be dry and healing. No drainage or fluctuance. Left extremity is edematous with 2+ pitting edema that extends to proximal shin. No posterior calf tenderness or palpable cords. There is no edema of the right lower extremity. Skin:     General: Skin is warm and dry. Neurological:      General: No focal deficit present. Mental Status: She is alert and oriented to person, place, and time. Psychiatric:         Mood and Affect: Mood normal.         Behavior: Behavior normal. Behavior is cooperative. Diagnostic Study Results     Labs -     Recent Results (from the past 12 hour(s))   CBC WITH AUTOMATED DIFF    Collection Time: 06/08/21  8:33 PM   Result Value Ref Range    WBC 10.4 3.6 - 11.0 K/uL    RBC 4.33 3.80 - 5.20 M/uL    HGB 13.0 11.5 - 16.0 g/dL    HCT 38.6 35.0 - 47.0 %    MCV 89.1 80.0 - 99.0 FL    MCH 30.0 26.0 - 34.0 PG    MCHC 33.7 30.0 - 36.5 g/dL    RDW 12.6 11.5 - 14.5 %    PLATELET 834 184 - 789 K/uL    MPV 10.6 8.9 - 12.9 FL    NRBC 0.0 0  WBC    ABSOLUTE NRBC 0.00 0.00 - 0.01 K/uL    NEUTROPHILS 56 32 - 75 %    LYMPHOCYTES 34 12 - 49 %    MONOCYTES 7 5 - 13 %    EOSINOPHILS 2 0 - 7 %    BASOPHILS 1 0 - 1 %    IMMATURE GRANULOCYTES 0 0.0 - 0.5 %    ABS. NEUTROPHILS 5.9 1.8 - 8.0 K/UL    ABS. LYMPHOCYTES 3.6 (H) 0.8 - 3.5 K/UL    ABS. MONOCYTES 0.7 0.0 - 1.0 K/UL    ABS. EOSINOPHILS 0.2 0.0 - 0.4 K/UL    ABS. BASOPHILS 0.1 0.0 - 0.1 K/UL    ABS. IMM.  GRANS. 0.0 0.00 - 0.04 K/UL    DF AUTOMATED     METABOLIC PANEL, COMPREHENSIVE    Collection Time: 06/08/21  8:33 PM   Result Value Ref Range    Sodium 141 136 - 145 mmol/L    Potassium 3.8 3.5 - 5.1 mmol/L    Chloride 105 97 - 108 mmol/L    CO2 28 21 - 32 mmol/L    Anion gap 8 5 - 15 mmol/L    Glucose 134 (H) 65 - 100 mg/dL    BUN 17 6 - 20 MG/DL    Creatinine 0.84 0.55 - 1.02 MG/DL    BUN/Creatinine ratio 20 12 - 20      GFR est AA >60 >60 ml/min/1.73m2    GFR est non-AA >60 >60 ml/min/1.73m2    Calcium 9.1 8.5 - 10.1 MG/DL    Bilirubin, total 0.2 0.2 - 1.0 MG/DL    ALT (SGPT) 173 (H) 12 - 78 U/L    AST (SGOT) 82 (H) 15 - 37 U/L    Alk. phosphatase 685 (H) 45 - 117 U/L    Protein, total 7.2 6.4 - 8.2 g/dL    Albumin 2.9 (L) 3.5 - 5.0 g/dL    Globulin 4.3 (H) 2.0 - 4.0 g/dL    A-G Ratio 0.7 (L) 1.1 - 2.2     TROPONIN I    Collection Time: 06/08/21  8:33 PM   Result Value Ref Range    Troponin-I, Qt. <0.05 <0.05 ng/mL   HCG URINE, QL. - POC    Collection Time: 06/08/21  8:33 PM   Result Value Ref Range    Pregnancy test,urine (POC) Negative NEG         Radiologic Studies -   XR CHEST PORT   Final Result   No acute process identified. CT Results  (Last 48 hours)    None        CXR Results  (Last 48 hours)               06/08/21 2026  XR CHEST PORT Final result    Impression:  No acute process identified. Narrative:  Clinical history: CP   INDICATION:   CP   COMPARISON: 6/2/2021       FINDINGS:   AP portable upright view of the chest demonstrates a stable  cardiopericardial   silhouette. There is no pleural effusion. .There is no focal consolidation. .There   is no pneumothorax. .                    Medical Decision Making   I am the first provider for this patient. I reviewed the vital signs, available nursing notes, past medical history, past surgical history, family history and social history. Vital Signs-Reviewed the patient's vital signs.   Patient Vitals for the past 12 hrs:   Temp Pulse Resp BP SpO2   06/08/21 2220 -- -- -- -- 99 %   06/08/21 2218 -- -- -- 122/74 --   06/08/21 2005 -- -- -- -- 100 %   06/08/21 2003 -- -- -- 127/77 --   06/08/21 1934 97.7 °F (36.5 °C) 94 22 (!) 158/85 99 %       Records Reviewed: Nursing Notes, Old Medical Records, Previous Radiology Studies and Previous Laboratory Studies    Provider Notes (Medical Decision Making):   74-year-old female presents with chest tightness described as pressure with associated shortness of breath that has been ongoing for over a week. Patient is being treated for cellulitis with clindamycin. She states that she is concerned the clindamycin may be causing this chest tightness, despite the fact that chest tightness was noted prior to starting the clindamycin. In the ED she is slightly anxious but overall well-appearing and in no acute distress. She is PERC negative. Her vital signs are stable. The cellulitis of the left lower extremity appears to be improving. Labs including troponin and EKG obtained, results negative/normal.  Repeat Doppler of the left lower extremity shows no DVT. Reviewed all findings with patient and partner at bedside. They are agreeable to discharge home with plans for follow-up with PCP. She will continue taking the clindamycin. ED return precautions given. ED Course:   Initial assessment performed. The patients presenting problems have been discussed, and they are in agreement with the care plan formulated and outlined with them. I have encouraged them to ask questions as they arise throughout their visit. Case discussed with attending, Cody Michael MD.     Critical Care Time: None    Disposition:  D/c    PLAN:  1. Discharge Medication List as of 6/8/2021 10:03 PM        2. Follow-up Information     Follow up With Specialties Details Why Contact Info    [facility] EMERGENCY DEPT Emergency Medicine  As needed, If symptoms worsen [address] [facility]    [facility]  Call  For follow up [address]  [phone]        Return to ED if worse     Diagnosis     Clinical Impression:   1. Atypical chest pain    2.  Cellulitis of left lower extremity    3. Leg edema, left          Please note that this dictation was completed with RecordSled, the computer voice recognition software. Quite often unanticipated grammatical, syntax, homophones, and other interpretive errors are inadvertently transcribed by the computer software. Please disregards these errors. Please excuse any errors that have escaped final proofreading.

## 2021-06-08 NOTE — ED TRIAGE NOTES
Pt comes in ambulatory reporting chest and throat tightness starting 06/04 when she started taking clindamycin 300 mg. Pt was seen here for leg swelling. Pt reports legs still swelling also.

## 2021-06-09 PROCEDURE — 93971 EXTREMITY STUDY: CPT | Performed by: INTERNAL MEDICINE

## 2021-06-09 RX ORDER — KETOROLAC TROMETHAMINE 10 MG/1
10 TABLET, FILM COATED ORAL
Qty: 15 TABLET | Refills: 0 | Status: SHIPPED | OUTPATIENT
Start: 2021-06-09

## 2021-06-09 NOTE — ED NOTES
Patient resting quietly on stretcher. Awaiting Doppler studies. NAD noted. Will continue to monitor.

## 2021-06-09 NOTE — ED NOTES
Patient c/o chest tightness off and on x1 month Patient was started on Clindamycin on 06/04/21 and states she feels like the chest tightness is worse after starting antibiotics. Patient also c/o LLL swelling. She has a healing wound on LLL from a \"bug bite\". Wound scabbed over. No drainage. Small amount of redness noted. +2 pitting edema in LLL. +Brisk cap refill +PPP. Emergency Department Nursing Plan of Care       The Nursing Plan of Care is developed from the Nursing assessment and Emergency Department Attending provider initial evaluation. The plan of care may be reviewed in the ED Provider note.     The Plan of Care was developed with the following considerations:   Patient / Family readiness to learn indicated by:verbalized understanding  Persons(s) to be included in education: patient  Barriers to Learning/Limitations:No    Signed     Buck Part, [facility]    6/8/2021   9:50 PM

## 2021-06-09 NOTE — DISCHARGE INSTRUCTIONS
Local Primary Care Physicians  [facility] Physicians [phone]  Lisa Greenberg, MD Fabio Ruvalcaba, MD Clarke Whitmore MD [facility] Doctors [phone]  Brett Baez, [facility]  Mehul Montana, MD Terry Downing, MD Maura [facility] [phone]  [place] [address] [facility] [phone]  Lacie Falcon, MD Meryl Dye, MD Willaim Halsted, MD Miracle Carmona MD   [facility] [phone]  [facility] XYCPQG GJ, MD Angel Cazares, MD Ruben Rowan, NP [address] [phone]  MD Antelmo Fajardo MD King Mole, MD Micheline Dunbar, MD Jenn Acharya, MD Consuela Kayser, MD Felicity Rice MD   [address] [facility]  Lidya Sullivan MD [facility] [phone]  MD Ashlyn Somers, NP  Carmela Curran, MD Gustavo Borjas, MD Varsha Mars, MD Leo Luo, MD Ernestine Hensley, MD   [address]74 [facility] [phone]  Presley Mays, MD Giselle Krueger, P  Luis Carlos Mark, GABBIE Caro Mt, MD Apolinar Justin, MD Ted Sam, MD Salma Puri MD [facility] [phone]  Teryl Burkitt, MD [facility], MD Ursula Muster, MD Christina Kang, MD Vern Cannon MD   [address] [phone]  MD Naty Mueller MD [place] [phone]  MD Ranjana Dexter MD Joleen Pun, MD   [facility] [phone]  MD Rick Brand, MD Piedad Fontaine, MD Sherry Buckley, MD Flaco Santos, MD Colton Vasquez, NP  Oscar Murray MD [address]   [phone]  MD Franc Cazares MD Daryl Sas, MD   [address] [facility] [phone]  [address], MD Jesus Tapia, DANIEL Griffin, ZULEIKA Griffin, DANIEL Cortés, PA-C Sybil Abate, MD Williams Pupa, NP Clemens Severin, DO             Miscellaneous:  Ginny Alegre MD [facility] Emergency Room Departments     For adult and child immunizations, family planning, TB screening, STD testing and women's health services. [facility]: [place] [phone]      [place] Contreras [address]   [address]   [address] [facility] [address]   170 [facility]: Marlen Nicole [address] [facility] [address] [phone]      [address] [facility]          [address] [address]     For primary care services, woman and child wellness, and some clinics providing specialty care. [facility] -- [address] [facility] [address] [facility] [phone]   411 [facility] 200 [facility] 3614 [facility] [phone]   [address] [facility] [address] [phone] [address]  Community  [phone]   [address] [phone]   [facility] 81 [facility] [phone]   [facility] [place] 1051 [facility] [phone]   [facility]: [facility] [address] [place] [address], [address] [phone]     [facility] [address] [facility] [address] [phone]   [facility] Out[facility] [address]  Community  [phone]   [employer]  [address] ([email or web address]) [phone]         Sexual Health/Woman Wellness Clinics    For STD/HIV testing and treatment, pregnancy testing and services, men's health, birth control services, LGBT services, and hepatitis/HPV vaccine services. [place] [employer] [place] for [place] [employer] [address] [facility] 75 [facility] [address] [facility] 1579 [address] [phone]   [address], 5th floor [phone]   Pregnancy [facility] of 59 [facility] 118 VIKTORIYA Garcia[facility] Hospital Association[facility] [phone]         [address] [facility] [address] [place] [address]   [phone]   [place]   [phone]   Women, Infant and Children's Services: [address] [phone]       600 [facility]   [phone]   [address] Psychiatry     [phone]   [address] Crisis   1212 [facility] [phone]

## 2021-06-10 LAB
ATRIAL RATE: 83 BPM
CALCULATED P AXIS, ECG09: 59 DEGREES
CALCULATED R AXIS, ECG10: -46 DEGREES
CALCULATED T AXIS, ECG11: 29 DEGREES
DIAGNOSIS, 93000: NORMAL
P-R INTERVAL, ECG05: 158 MS
Q-T INTERVAL, ECG07: 364 MS
QRS DURATION, ECG06: 66 MS
QTC CALCULATION (BEZET), ECG08: 427 MS
VENTRICULAR RATE, ECG03: 83 BPM

## 2022-03-18 PROBLEM — E11.21 TYPE 2 DIABETES WITH NEPHROPATHY (HCC): Status: ACTIVE | Noted: 2018-06-21

## 2022-06-18 ENCOUNTER — HOSPITAL ENCOUNTER (EMERGENCY)
Age: 42
Discharge: HOME | End: 2022-06-19
Attending: EMERGENCY MEDICINE
Payer: COMMERCIAL

## 2022-06-18 ENCOUNTER — APPOINTMENT (OUTPATIENT)
Dept: GENERAL RADIOLOGY | Age: 42
End: 2022-06-18
Attending: EMERGENCY MEDICINE
Payer: COMMERCIAL

## 2022-06-18 VITALS
TEMPERATURE: 99.2 F | HEIGHT: 65 IN | DIASTOLIC BLOOD PRESSURE: 108 MMHG | BODY MASS INDEX: 27.49 KG/M2 | RESPIRATION RATE: 16 BRPM | OXYGEN SATURATION: 100 % | HEART RATE: 89 BPM | WEIGHT: 165 LBS | SYSTOLIC BLOOD PRESSURE: 146 MMHG

## 2022-06-18 DIAGNOSIS — J02.9 PHARYNGITIS, UNSPECIFIED ETIOLOGY: Primary | ICD-10-CM

## 2022-06-18 LAB
DEPRECATED S PYO AG THROAT QL EIA: NEGATIVE
FLUAV RNA SPEC QL NAA+PROBE: NOT DETECTED
FLUBV RNA SPEC QL NAA+PROBE: NOT DETECTED
HCG UR QL: NEGATIVE
SARS-COV-2, COV2: NOT DETECTED

## 2022-06-18 PROCEDURE — 99285 EMERGENCY DEPT VISIT HI MDM: CPT

## 2022-06-18 PROCEDURE — 96372 THER/PROPH/DIAG INJ SC/IM: CPT

## 2022-06-18 PROCEDURE — 93005 ELECTROCARDIOGRAM TRACING: CPT

## 2022-06-18 PROCEDURE — 87070 CULTURE OTHR SPECIMN AEROBIC: CPT

## 2022-06-18 PROCEDURE — 71046 X-RAY EXAM CHEST 2 VIEWS: CPT

## 2022-06-18 PROCEDURE — 74011250636 HC RX REV CODE- 250/636: Performed by: EMERGENCY MEDICINE

## 2022-06-18 PROCEDURE — 87636 SARSCOV2 & INF A&B AMP PRB: CPT

## 2022-06-18 PROCEDURE — 74011250637 HC RX REV CODE- 250/637: Performed by: EMERGENCY MEDICINE

## 2022-06-18 PROCEDURE — 74011000250 HC RX REV CODE- 250: Performed by: EMERGENCY MEDICINE

## 2022-06-18 PROCEDURE — 87880 STREP A ASSAY W/OPTIC: CPT

## 2022-06-18 PROCEDURE — 81025 URINE PREGNANCY TEST: CPT

## 2022-06-18 RX ORDER — KETOROLAC TROMETHAMINE 30 MG/ML
30 INJECTION, SOLUTION INTRAMUSCULAR; INTRAVENOUS
Status: COMPLETED | OUTPATIENT
Start: 2022-06-18 | End: 2022-06-18

## 2022-06-18 RX ORDER — DEXAMETHASONE SODIUM PHOSPHATE 100 MG/10ML
10 INJECTION INTRAMUSCULAR; INTRAVENOUS
Status: COMPLETED | OUTPATIENT
Start: 2022-06-18 | End: 2022-06-18

## 2022-06-18 RX ADMIN — KETOROLAC TROMETHAMINE 30 MG: 30 INJECTION, SOLUTION INTRAMUSCULAR; INTRAVENOUS at 22:14

## 2022-06-18 RX ADMIN — DEXAMETHASONE SODIUM PHOSPHATE 10 MG: 10 INJECTION INTRAMUSCULAR; INTRAVENOUS at 23:15

## 2022-06-18 RX ADMIN — ALUMINUM HYDROXIDE, MAGNESIUM HYDROXIDE, AND SIMETHICONE 40 ML: 200; 200; 20 SUSPENSION ORAL at 23:15

## 2022-06-18 NOTE — LETTER
[facility] EMERGENCY DEPT  [address] [facility] [id number] [phone]    Work/School Note    Date: 6/18/2022    To Whom It May concern:    Mara Burdick was seen and treated today in the emergency room by the following provider(s):  Attending Provider: Doc Alejandre DO. Mara Burdick may return to work on 6/20/22.     Sincerely,          Ashia Smith

## 2022-06-19 RX ORDER — IBUPROFEN 600 MG/1
600 TABLET ORAL
Qty: 20 TABLET | Refills: 0 | Status: SHIPPED | OUTPATIENT
Start: 2022-06-19

## 2022-06-19 NOTE — ED TRIAGE NOTES
CC of CP, SOB, sore throat, difficulty swallowing, and productive cough sine Tuesday. Pt states that she has been taking halls with no relief.  Pt states that she has an abscess to chest.

## 2022-06-19 NOTE — ED NOTES
Pt reports to ER w/ gen body aches, sore throat and pain when swallowing x 4 days. Reports she has been around others with sore throats. At home covid test done this am was negative    Alert and oriented x4. Skin warm dry and intact. Ambulates independently. Emergency Department Nursing Plan of Care       The Nursing Plan of Care is developed from the Nursing assessment and Emergency Department Attending provider initial evaluation. The plan of care may be reviewed in the ED Provider note.     The Plan of Care was developed with the following considerations:   Patient / Family readiness to learn indicated by:verbalized understanding  Persons(s) to be included in education: patient  Barriers to Learning/Limitations:No    Signed     Sotomayor Lies    6/18/2022   10:05 PM

## 2022-06-19 NOTE — ED PROVIDER NOTES
EMERGENCY DEPARTMENT HISTORY AND PHYSICAL EXAM      Date: 6/18/2022  Patient Name: Adelso Adames    Please note that this dictation was completed with [employer], the computer voice recognition software. Quite often unanticipated grammatical, syntax, homophones, and other interpretive errors are inadvertently transcribed by the computer software. Please disregard these errors. Please excuse any errors that have escaped final proofreading. History of Presenting Illness     Chief Complaint   Patient presents with    Chest Pain    Shortness of Breath       History Provided By: Patient     HPI: Adelso Adames, 46 y.o. female, presenting the emergency department complaining of sore throat, cough, congestion, chest pain, body aches. Symptoms been going on for about 3 days. Nothing makes it better, nothing makes it worse. Cough is nonproductive. No fever. Does report chills. Reports diffuse body aches. Positive sick contact in her children who both have sore throats. She took a home COVID test this morning which was negative. She is insulin-dependent diabetic and does report fluctuating blood sugars. She denies any abdominal pain. Denies any unilateral leg pain or leg swelling. PCP: Cayden Knapp MD    No current facility-administered medications on file prior to encounter. Current Outpatient Medications on File Prior to Encounter   Medication Sig Dispense Refill    ketorolac (TORADOL) 10 mg tablet Take 1 Tablet by mouth every six (6) hours as needed for Pain. 15 Tablet 0    insulin NPH (HUMULIN N NPH U-100 INSULIN) 100 unit/mL injection INJECT 30 UNITS UNDER SKIN IN AM AND 25 AT NIGHT 10 mL 12    Blood-Glucose Meter monitoring kit Use daily as directed.     E10.9    TYPE: one 1 Kit 0    glucose blood VI test strips (TRUETRACK TEST) strip Check blood glucose 2-3 times a day 100 Strip 3    Insulin Needles, Disposable, 29 gauge x 1/2\" ndle Use as directed 100 Pen Needle 12  Blood-Glucose Meter (CONTOUR METER) monitoring kit Use as directed 1 Kit 0    Lancets Misc As directed 1 Package 11    albuterol (PROVENTIL HFA, VENTOLIN HFA, PROAIR HFA) 90 mcg/actuation inhaler Take 2 Puffs by inhalation every four (4) hours as needed for Wheezing. 1 Inhaler 12       Past History     Past Medical History:  Past Medical History:   Diagnosis Date    Asthma     Diabetes ([address] 1991    Type 1    Headache        Past Surgical History:  Past Surgical History:   Procedure Laterality Date    HX  SECTION      HX GYN  2010    L ectopic pregnancy, L fallopian tube removed       Family History:  Family History   Problem Relation Age of Onset    Diabetes Mother     Hypertension Mother     Diabetes Father     Hypertension Father        Social History:  Social History     Tobacco Use    Smoking status: Never Smoker    Smokeless tobacco: Never Used   Substance Use Topics    Alcohol use: No    Drug use: No       Allergies: Allergies   Allergen Reactions    Doxycycline Hives         Review of Systems   Review of Systems   Constitutional: Positive for chills. Negative for fever. HENT: Positive for congestion, ear pain and sore throat. Eyes: Negative for visual disturbance. Respiratory: Positive for cough and shortness of breath. Cardiovascular: Negative for chest pain and leg swelling. Gastrointestinal: Negative for abdominal pain, blood in stool, diarrhea and nausea. Endocrine: Negative for polyuria. Genitourinary: Negative for dysuria, flank pain, vaginal bleeding and vaginal discharge. Musculoskeletal: Positive for myalgias. Skin: Negative for rash. Allergic/Immunologic: Negative for immunocompromised state. Neurological: Negative for weakness and headaches. Psychiatric/Behavioral: Negative for confusion. Physical Exam   Physical Exam  Vitals and nursing note reviewed. Constitutional:       Appearance: She is well-developed.    HENT:      Head: Normocephalic and atraumatic. Right Ear: Tympanic membrane normal.      Left Ear: Tympanic membrane normal.      Mouth/Throat:      Comments: Posterior pharynx mildly erythematous, uvula elevates midline. No trismus, full range of motion of the neck  Eyes:      General:         Right eye: No discharge. Left eye: No discharge. Conjunctiva/sclera: Conjunctivae normal.      Pupils: Pupils are equal, round, and reactive to light. Neck:      Trachea: No tracheal deviation. Cardiovascular:      Rate and Rhythm: Normal rate and regular rhythm. Heart sounds: Normal heart sounds. No murmur heard. Pulmonary:      Effort: Pulmonary effort is normal. No respiratory distress. Breath sounds: Normal breath sounds. No wheezing or rales. Abdominal:      General: Bowel sounds are normal.      Palpations: Abdomen is soft. Tenderness: There is no abdominal tenderness. There is no guarding or rebound. Musculoskeletal:         General: No tenderness or deformity. Normal range of motion. Cervical back: Normal range of motion and neck supple. Lymphadenopathy:      Cervical: Cervical adenopathy present. Skin:     General: Skin is warm and dry. Findings: No erythema or rash. Neurological:      Mental Status: She is alert and oriented to person, place, and time.    Psychiatric:         Behavior: Behavior normal.         Diagnostic Study Results     Labs -     Recent Results (from the past 12 hour(s))   EKG, 12 LEAD, INITIAL    Collection Time: 06/18/22 10:07 PM   Result Value Ref Range    Ventricular Rate 85 BPM    Atrial Rate 85 BPM    P-R Interval 154 ms    QRS Duration 70 ms    Q-T Interval 356 ms    QTC Calculation (Bezet) 423 ms    Calculated P Axis 65 degrees    Calculated R Axis -26 degrees    Calculated T Axis 20 degrees    Diagnosis       ** Poor data quality, interpretation may be adversely affected  Normal sinus rhythm  Septal infarct (cited on or before 02-JUN-2021)  When compared with ECG of 08-JUN-2021 20:16,  Questionable change in initial forces of Septal leads     COVID-19 WITH INFLUENZA A/B    Collection Time: 06/18/22 10:14 PM   Result Value Ref Range    SARS-CoV-2 by PCR Not detected NOTD      Influenza A by PCR Not detected      Influenza B by PCR Not detected     STREP AG SCREEN, GROUP A    Collection Time: 06/18/22 10:14 PM    Specimen: Swab; Throat   Result Value Ref Range    Group A Strep Ag ID Negative NEG     HCG URINE, QL. - POC    Collection Time: 06/18/22 10:22 PM   Result Value Ref Range    Pregnancy test,urine (POC) Negative NEG         Radiologic Studies -   XR CHEST PA LAT   Final Result   No acute process. CT Results  (Last 48 hours)    None        CXR Results  (Last 48 hours)               06/18/22 2238  XR CHEST PA LAT Final result    Impression:  No acute process. Narrative:  EXAM:  CR chest PA lateral       INDICATION: Chest pain and shortness of breath       COMPARISON: 6/8/2021. TECHNIQUE: Frontal and lateral chest views       FINDINGS: The cardiomediastinal contours are stable. The lungs and pleural   spaces are clear. There is no pneumothorax. The bones and upper abdomen are   stable. Medical Decision Making   I am the first provider for this patient. I reviewed the vital signs, available nursing notes, past medical history, past surgical history, family history and social history. Vital Signs-Reviewed the patient's vital signs. Patient Vitals for the past 12 hrs:   Temp Pulse Resp BP SpO2   06/18/22 2134 99.2 °F (37.3 °C) 89 16 (!) 146/108 100 %       EKG interpretation: (Preliminary)  EKG shows sinus rhythm, rate 85. Normal axis. Normal intervals. No evidence of acute ischemic ST-T wave changes. Interpreted by me    Records Reviewed:   Nursing notes, Prior visits     Provider Notes (Medical Decision Making):   Presenting with what appears to be viral syndrome.   No evidence of airway compromise at this time. Clinical suspicion for epiglottitis or retropharyngeal abscess is low. No evidence of peritonsillar abscess on exam.  Will test for flu, COVID. Will provide symptomatic relief. We will check a chest x-ray and EKG, doubt ACS, doubt PE    ED Course:   Initial assessment performed. The patients presenting problems have been discussed, and they are in agreement with the care plan formulated and outlined with them. I have encouraged them to ask questions as they arise throughout their visit. ED Course as of 06/19/22 0320   Sat Jun 18, 2022   2301 Patient feeling better, alerted to results, still complaining of severe sore throat discomfort. We will try GI cocktail, will give a dose of dexamethasone. Get these are not improving her symptoms will consider CT imaging although clinical suspicion for retropharyngeal abscess, peritonsillar abscess, epiglottitis is low [AR]      ED Course User Index  [AR] Viktoriya Diver, DO               Critical Care Time:   none    Disposition:    DISCHARGE NOTE  Patients results have been reviewed with them. Patient and/or family have verbally conveyed their understanding and agreement of the patient's signs, symptoms, diagnosis, treatment and prognosis and additionally agree to follow up as recommended or return to the Emergency Room should their condition change or have any new concerns prior to their follow-up appointment. Patient verbally agrees with the care-plan and verbally conveys that all of their questions have been answered. Discharge instructions have also been provided to the patient with some educational information regarding their diagnosis as well a list of reasons why they would want to return to the ER prior to their follow-up appointment should their condition change. PLAN:  1.    Discharge Medication List as of 6/19/2022 12:48 AM      START taking these medications    Details   ibuprofen (MOTRIN) 600 mg tablet Take 1 Tablet by mouth every six (6) hours as needed for Pain., Normal, Disp-20 Tablet, R-0      diphenhyd/lidocaine/mag,Al/sim (magic mouthwash) mwsh oral suspension (compounded) Take 5 mL by mouth Before breakfast, lunch, and dinner., Print, Disp-100 mL, R-0         CONTINUE these medications which have NOT CHANGED    Details   ketorolac (TORADOL) 10 mg tablet Take 1 Tablet by mouth every six (6) hours as needed for Pain., Normal, Disp-15 Tablet, R-0      insulin NPH (HUMULIN N NPH U-100 INSULIN) 100 unit/mL injection INJECT 30 UNITS UNDER SKIN IN AM AND 25 AT NIGHT, Normal, Disp-10 mL, R-12      !! Blood-Glucose Meter monitoring kit Use daily as directed. E10.9    TYPE: one, Print, Disp-1 Kit, R-0      glucose blood VI test strips (TRUETRACK TEST) strip Check blood glucose 2-3 times a day, Normal, Disp-100 Strip, R-3      Insulin Needles, Disposable, 29 gauge x 1/2\" ndle Use as directed, Normal, Disp-100 Pen Needle, R-12      !! Blood-Glucose Meter (CONTOUR METER) monitoring kit Use as directedNormal, Disp-1 Kit, R-0      Lancets Misc As directedNormal, Disp-1 Package, R-11      albuterol (PROVENTIL HFA, VENTOLIN HFA, PROAIR HFA) 90 mcg/actuation inhaler Take 2 Puffs by inhalation every four (4) hours as needed for Wheezing., Normal, Disp-1 Inhaler, R-12       !! - Potential duplicate medications found. Please discuss with provider. 2.   Follow-up Information     Follow up With Specialties Details Why Contact Info    Kwasi Anna MD Family Medicine Schedule an appointment as soon as possible for a visit   [address] [place] [id number] [phone]      [facility] EMERGENCY DEPT Emergency Medicine  If symptoms worsen [facility]  [phone]          Return to ED if worse     Diagnosis     Clinical Impression:   1. Pharyngitis, unspecified etiology        Attestations:   This note was completed by Emre Browning DO

## 2022-06-20 LAB
ATRIAL RATE: 85 BPM
CALCULATED P AXIS, ECG09: 65 DEGREES
CALCULATED R AXIS, ECG10: -26 DEGREES
CALCULATED T AXIS, ECG11: 20 DEGREES
DIAGNOSIS, 93000: NORMAL
P-R INTERVAL, ECG05: 154 MS
Q-T INTERVAL, ECG07: 356 MS
QRS DURATION, ECG06: 70 MS
QTC CALCULATION (BEZET), ECG08: 423 MS
VENTRICULAR RATE, ECG03: 85 BPM

## 2022-06-21 LAB
BACTERIA SPEC CULT: NORMAL
SERVICE CMNT-IMP: NORMAL

## 2022-10-26 NOTE — PROGRESS NOTES
Chief Complaint   Patient presents with    Nausea     since last thursday    Headache     for approx 3 days     1. Have you been to the ER, urgent care clinic since your last visit? Hospitalized since your last visit? No    2. Have you seen or consulted any other health care providers outside of the [address] [place] [address] since your last visit? Include any pap smears or colon screening.  No No difficulties

## 2022-12-21 ENCOUNTER — HOSPITAL ENCOUNTER (EMERGENCY)
Age: 42
Discharge: HOME | End: 2022-12-21
Attending: EMERGENCY MEDICINE
Payer: COMMERCIAL

## 2022-12-21 VITALS
DIASTOLIC BLOOD PRESSURE: 88 MMHG | HEART RATE: 80 BPM | BODY MASS INDEX: 24.91 KG/M2 | SYSTOLIC BLOOD PRESSURE: 164 MMHG | HEIGHT: 66 IN | TEMPERATURE: 98 F | WEIGHT: 155 LBS | RESPIRATION RATE: 16 BRPM | OXYGEN SATURATION: 100 %

## 2022-12-21 DIAGNOSIS — S46.911A SHOULDER STRAIN, RIGHT, INITIAL ENCOUNTER: Primary | ICD-10-CM
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
## 2022-12-21 PROCEDURE — 99283 EMERGENCY DEPT VISIT LOW MDM: CPT

## 2022-12-21 RX ORDER — CYCLOBENZAPRINE HCL 10 MG
10 TABLET ORAL
Qty: 15 TABLET | Refills: 0 | Status: SHIPPED | OUTPATIENT
Start: 2022-12-21

## 2022-12-21 RX ORDER — LOSARTAN POTASSIUM 25 MG/1
TABLET ORAL DAILY
COMMUNITY

## 2022-12-21 RX ORDER — LIDOCAINE 50 MG/G
PATCH TOPICAL
Qty: 10 EACH | Refills: 0 | Status: SHIPPED | OUTPATIENT
Start: 2022-12-21

## 2022-12-21 RX ORDER — INSULIN LISPRO 100 [IU]/ML
INJECTION, SOLUTION INTRAVENOUS; SUBCUTANEOUS
COMMUNITY

## 2022-12-21 RX ORDER — NAPROXEN 500 MG/1
500 TABLET ORAL
Qty: 20 TABLET | Refills: 0 | Status: SHIPPED | OUTPATIENT
Start: 2022-12-21 | End: 2022-12-31

## 2022-12-21 RX ORDER — ATORVASTATIN CALCIUM 10 MG/1
10 TABLET, FILM COATED ORAL DAILY
COMMUNITY

## 2022-12-21 RX ORDER — INSULIN GLARGINE 100 [IU]/ML
INJECTION, SOLUTION SUBCUTANEOUS
COMMUNITY
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
## 2022-12-21 NOTE — Clinical Note
[facility] EMERGENCY DEPT  [address] [facility] [id number] [phone]    Work/School Note    Date: 12/21/2022    To Whom It May concern:    Akshat Damon was seen and treated today in the emergency room by the following provider(s):  Attending Provider: Virgilio Colvin MD  Physician Assistant: Emeterio Dee PA-C. Akshat Damon is excused from work/school on 12/21/22 and 12/22/22. She is medically clear to return to work/school on 12/23/2022.        Sincerely,          Wesley Lee PA-C
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
## 2024-08-22 NOTE — ED NOTES
Pt called today under the impression that she had a prescription for pain medicine sent to the pharmacy. Patient upset about the miscommunication, so since she did get Toradol IV while in the ED sent the same to the pharmacy today. Pt's appointment scheduled. Verbalized acceptance of co pay